# Patient Record
Sex: FEMALE | Race: WHITE | HISPANIC OR LATINO | Employment: UNEMPLOYED | URBAN - METROPOLITAN AREA
[De-identification: names, ages, dates, MRNs, and addresses within clinical notes are randomized per-mention and may not be internally consistent; named-entity substitution may affect disease eponyms.]

---

## 2020-03-27 ENCOUNTER — HOSPITAL ENCOUNTER (EMERGENCY)
Facility: MEDICAL CENTER | Age: 60
End: 2020-03-27
Attending: EMERGENCY MEDICINE
Payer: COMMERCIAL

## 2020-03-27 ENCOUNTER — APPOINTMENT (OUTPATIENT)
Dept: RADIOLOGY | Facility: MEDICAL CENTER | Age: 60
End: 2020-03-27
Attending: EMERGENCY MEDICINE
Payer: COMMERCIAL

## 2020-03-27 VITALS
DIASTOLIC BLOOD PRESSURE: 60 MMHG | RESPIRATION RATE: 18 BRPM | HEIGHT: 67 IN | HEART RATE: 75 BPM | WEIGHT: 170.42 LBS | OXYGEN SATURATION: 98 % | BODY MASS INDEX: 26.75 KG/M2 | SYSTOLIC BLOOD PRESSURE: 101 MMHG | TEMPERATURE: 98.6 F

## 2020-03-27 DIAGNOSIS — S09.90XA CLOSED HEAD INJURY, INITIAL ENCOUNTER: ICD-10-CM
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
## 2020-03-27 PROCEDURE — 99283 EMERGENCY DEPT VISIT LOW MDM: CPT

## 2020-03-27 PROCEDURE — 70450 CT HEAD/BRAIN W/O DYE: CPT

## 2020-03-27 ASSESSMENT — ENCOUNTER SYMPTOMS
DOUBLE VISION: 0
SHORTNESS OF BREATH: 0
ABDOMINAL PAIN: 0
HEADACHES: 1
BLURRED VISION: 0

## 2020-03-28 NOTE — ED PROVIDER NOTES
ED Provider Note    Means of arrival: private vehicle  History obtained from: patient  History limited by: none    CHIEF COMPLAINT  Chief Complaint   Patient presents with   • Fall       HPI  Susy Wheeler is a 59 y.o. female who presents to the Emergency Department for fall.  Patient reports that she was walking her dogs and tripped and fell hitting the left side of her head on pavement.  This occurred approximately 1 hour prior to arrival.  She does not believe she lost consciousness.  She reports that she has mild throbbing left-sided headache since the fall.  Denies any neck pain or any other injuries.  Patient is concerned about intracranial hemorrhage and therefore came in for further evaluation.  She does not take any blood thinners.  She denies blurred vision, numbness, tingling, or weakness.    REVIEW OF SYSTEMS  Review of Systems   Eyes: Negative for blurred vision and double vision.   Respiratory: Negative for shortness of breath.    Cardiovascular: Negative for chest pain.   Gastrointestinal: Negative for abdominal pain.   Neurological: Positive for headaches.   All other systems reviewed and are negative.        PAST MEDICAL HISTORY   None    SURGICAL HISTORY   has a past surgical history that includes gyn surgery.    SOCIAL HISTORY  Social History     Tobacco Use   • Smoking status: Never Smoker   • Smokeless tobacco: Never Used   Substance Use Topics   • Alcohol use: Yes   • Drug use: Not Currently      Social History     Substance and Sexual Activity   Drug Use Not Currently       FAMILY HISTORY  History reviewed. No pertinent family history.    CURRENT MEDICATIONS  Home Medications     Reviewed by Jorge Berger R.N. (Registered Nurse) on 03/27/20 at 6378  Med List Status: Complete   Medication Last Dose Status        Patient Delano Taking any Medications                       ALLERGIES  No Known Allergies    PHYSICAL EXAM  VITAL SIGNS: /90   Pulse 94   Temp 36.6 °C (97.9 °F) (Tympanic)    "Resp 18   Ht 1.702 m (5' 7\")   Wt 77.3 kg (170 lb 6.7 oz)   SpO2 95%   BMI 26.69 kg/m²   Vitals reviewed by myself.  Physical Exam   Constitutional: She is oriented to person, place, and time and well-developed, well-nourished, and in no distress. No distress.   HENT:   Head: Normocephalic and atraumatic.   Eyes: Pupils are equal, round, and reactive to light. EOM are normal.   Neck: Normal range of motion.   Cardiovascular: Normal rate, regular rhythm and normal heart sounds. Exam reveals no gallop and no friction rub.   No murmur heard.  Pulmonary/Chest: Effort normal and breath sounds normal.   Musculoskeletal: Normal range of motion.      Comments: Strength is 5/5 in all extremities, no midline spinal tenderness, patient ambulates with a narrow based steady gait.    Neurological: She is alert and oriented to person, place, and time. No cranial nerve deficit. GCS score is 15.   Sensation intact in all extremities, no dysmetria on cerebellar testing    Skin: Skin is warm and dry.         DIAGNOSTIC STUDIES /  LABS      RADIOLOGY  CT-HEAD W/O   Final Result      No acute intracranial abnormality.        The radiologist's interpretation of all radiological studies have been reviewed by me.        COURSE & MEDICAL DECISION MAKING  Nursing notes, VS, PMSFHx reviewed in chart.    Patient is a 59 year old female who comes in for evaluation of headache after mechanical ground-level fall.  Differential diagnosis includes closed head injury, intracranial hemorrhage, concussion.  Diagnostic work-up includes CT of the head.    Patient's initial vitals are within normal limits, patient is neurologically intact on exam.  CT returns demonstrates no acute intracranial abnormalities.  Therefore patient is reassured and advised on symptomatic management of closed head injury.  She is then given strict return precautions and discharged in stable condition.      FINAL IMPRESSION  1. Fall, initial encounter    2. Closed head " injury, initial encounter

## 2021-01-18 ENCOUNTER — HOSPITAL ENCOUNTER (OUTPATIENT)
Facility: MEDICAL CENTER | Age: 61
End: 2021-01-18
Attending: PHYSICIAN ASSISTANT
Payer: COMMERCIAL

## 2021-01-18 ENCOUNTER — NURSE TRIAGE (OUTPATIENT)
Dept: HEALTH INFORMATION MANAGEMENT | Facility: OTHER | Age: 61
End: 2021-01-18

## 2021-01-18 ENCOUNTER — OFFICE VISIT (OUTPATIENT)
Dept: URGENT CARE | Facility: CLINIC | Age: 61
End: 2021-01-18
Payer: COMMERCIAL

## 2021-01-18 VITALS
TEMPERATURE: 98.3 F | DIASTOLIC BLOOD PRESSURE: 80 MMHG | OXYGEN SATURATION: 97 % | RESPIRATION RATE: 20 BRPM | SYSTOLIC BLOOD PRESSURE: 134 MMHG | BODY MASS INDEX: 24.71 KG/M2 | HEIGHT: 68 IN | WEIGHT: 163 LBS | HEART RATE: 78 BPM

## 2021-01-18 DIAGNOSIS — R19.5 LOOSE STOOLS: ICD-10-CM

## 2021-01-18 DIAGNOSIS — R11.0 NAUSEA: ICD-10-CM

## 2021-01-18 LAB
APPEARANCE UR: CLEAR
BILIRUB UR STRIP-MCNC: NORMAL MG/DL
COLOR UR AUTO: YELLOW
GLUCOSE UR STRIP.AUTO-MCNC: NORMAL MG/DL
KETONES UR STRIP.AUTO-MCNC: NORMAL MG/DL
LEUKOCYTE ESTERASE UR QL STRIP.AUTO: NORMAL
NITRITE UR QL STRIP.AUTO: NORMAL
PH UR STRIP.AUTO: 5.5 [PH] (ref 5–8)
PROT UR QL STRIP: NORMAL MG/DL
RBC UR QL AUTO: NORMAL
SP GR UR STRIP.AUTO: 1.03
UROBILINOGEN UR STRIP-MCNC: 0.2 MG/DL

## 2021-01-18 PROCEDURE — 81002 URINALYSIS NONAUTO W/O SCOPE: CPT | Performed by: PHYSICIAN ASSISTANT

## 2021-01-18 PROCEDURE — 99203 OFFICE O/P NEW LOW 30 MIN: CPT | Performed by: PHYSICIAN ASSISTANT

## 2021-01-18 PROCEDURE — U0005 INFEC AGEN DETEC AMPLI PROBE: HCPCS

## 2021-01-18 PROCEDURE — U0003 INFECTIOUS AGENT DETECTION BY NUCLEIC ACID (DNA OR RNA); SEVERE ACUTE RESPIRATORY SYNDROME CORONAVIRUS 2 (SARS-COV-2) (CORONAVIRUS DISEASE [COVID-19]), AMPLIFIED PROBE TECHNIQUE, MAKING USE OF HIGH THROUGHPUT TECHNOLOGIES AS DESCRIBED BY CMS-2020-01-R: HCPCS

## 2021-01-18 RX ORDER — ALUMINA, MAGNESIA, AND SIMETHICONE 2400; 2400; 240 MG/30ML; MG/30ML; MG/30ML
10 SUSPENSION ORAL 4 TIMES DAILY PRN
COMMUNITY
End: 2021-04-05

## 2021-01-18 RX ORDER — ONDANSETRON 4 MG/1
4 TABLET, FILM COATED ORAL EVERY 4 HOURS PRN
Qty: 10 TAB | Refills: 0 | Status: SHIPPED | OUTPATIENT
Start: 2021-01-18 | End: 2021-04-05

## 2021-01-18 ASSESSMENT — ENCOUNTER SYMPTOMS
CHILLS: 0
BLOOD IN STOOL: 0
DIARRHEA: 1
CONSTIPATION: 0
VOMITING: 0
FEVER: 0
HEARTBURN: 0
ABDOMINAL PAIN: 0
NAUSEA: 1

## 2021-01-18 NOTE — TELEPHONE ENCOUNTER
"For 1 week patient has been getting indigestion.  She has had diarrhea, nausea and vomiting, lack of appetite.  Pain in abdomen. Pain is not constant but is higher up in the middle of her upper abdomen.    Pt is scheduled for UC visit today due to abdominal pain and Nausea x 7 days.    Reason for Disposition  • Patient sounds very sick or weak to the triager    Additional Information  • Negative: Shock suspected (e.g., cold/pale/clammy skin, too weak to stand, low BP, rapid pulse)  • Negative: Sounds like a life-threatening emergency to the triager  • Negative: Nausea or vomiting and pregnancy < 20 weeks  • Negative: Menstrual Period - Missed or Late (i.e., pregnancy suspected)  • Negative: Heat exhaustion suspected (i.e., dehydration from heat exposure)  • Negative: Anxiety or stress suspected (i.e., nausea with anxiety attacks or stressful situations)  • Negative: Traumatic Brain Injury (TBI) suspected  • Negative: Vomiting occurs  • Negative: Other symptom is present, see that guideline.  (e.g., chest pain, headache, dizziness, abdominal pain, colds, sore throat, etc.).  • Negative: Unable to walk, or can only walk with assistance (e.g., requires support)  • Negative: Difficulty breathing  • Negative: Insulin-dependent diabetes (Type I) and glucose > 400 mg/dL (22 mmol/L)  • Negative: Drinking very little and dehydration suspected (e.g., no urine > 12 hours, very dry mouth, very lightheaded)    Answer Assessment - Initial Assessment Questions  1. NAUSEA SEVERITY: \"How bad is the nausea?\" (e.g., mild, moderate, severe; dehydration, weight loss)    - MILD: loss of appetite without change in eating habits    - MODERATE: decreased oral intake without significant weight loss, dehydration, or malnutrition    - SEVERE: inadequate caloric or fluid intake, significant weight loss, symptoms of dehydration      Nausea and vomiting (only 2 times in 7 days)  2. ONSET: \"When did the nausea begin?\"      About 7  Days ago  3. " "VOMITING: \"Any vomiting?\" If so, ask: \"How many times today?\"      2 times only in the last 7 days  4. RECURRENT SYMPTOM: \"Have you had nausea before?\" If so, ask: \"When was the last time?\" \"What happened that time?\"      GERD but feels this is worse and not going garima  5. CAUSE: \"What do you think is causing the nausea?\"      GERD  6. PREGNANCY: \"Is there any chance you are pregnant?\" (e.g., unprotected intercourse, missed birth control pill, broken condom)      NO    Protocols used: NAUSEA-A-OH      "

## 2021-01-18 NOTE — PROGRESS NOTES
Subjective:   Susy Wheeler is a 60 y.o. female who presents for Nausea (x 1 week, indigestion, diarrhea, slightly upper abdominal pain)        This is a new problem.  Patient presents with persistent nausea and loose stools for the last week.  She did have a brief improvement in symptoms yesterday but she felt nauseous again this morning.  She is not having any abdominal pain, chest pain, back pain, shoulder pain shortness of breath, difficulty breathing, colicky pain, dysuria, urinary frequency, urinary urgency, flank pain fevers, chills.  Stools have been persistently loose, but frequency of bowel movements is decreasing.  Overall she does feel like symptoms are improving.  She did travel to [place] approximately 10 days ago.  No known Covid exposures but she is concerned about possible Covid.  Denies cough or cold type symptoms.  She took Maalox without significant symptomatic improvement.    Review of Systems   Constitutional: Positive for malaise/fatigue. Negative for chills and fever.   Gastrointestinal: Positive for diarrhea and nausea. Negative for abdominal pain, blood in stool, constipation, heartburn, melena and vomiting.       PMH:  has a past medical history of Urinary tract infection.  MEDS:   Current Outpatient Medications:   •  ondansetron (ZOFRAN) 4 MG Tab tablet, Take 1 Tab by mouth every four hours as needed for Nausea/Vomiting., Disp: 10 Tab, Rfl: 0  •  mag hydrox-al hydrox-simeth (MAALOX PLUS ES OR MYLANTA DS) 400-400-40 MG/5ML Suspension, Take 10 mL by mouth 4 times a day as needed., Disp: , Rfl:   ALLERGIES: No Known Allergies  SURGHX:   Past Surgical History:   Procedure Laterality Date   • GYN SURGERY       SOCHX:  reports that she has never smoked. She has never used smokeless tobacco. She reports previous alcohol use. She reports previous drug use.  FH: Family history was reviewed, no pertinent findings to report   Objective:   /80 (BP Location: Right arm, Patient Position:  "Sitting, BP Cuff Size: Adult)   Pulse 78   Temp 36.8 °C (98.3 °F) (Temporal)   Resp 20   Ht 1.727 m (5' 8\")   Wt 73.9 kg (163 lb)   SpO2 97%   BMI 24.78 kg/m²   Physical Exam  Vitals signs reviewed.   Constitutional:       General: She is not in acute distress.     Appearance: Normal appearance. She is well-developed. She is not toxic-appearing.   HENT:      Head: Normocephalic and atraumatic.      Right Ear: External ear normal.      Left Ear: External ear normal.      Nose: Nose normal. No congestion.   Eyes:      General: Gaze aligned appropriately.   Neck:      Musculoskeletal: Neck supple.   Cardiovascular:      Rate and Rhythm: Normal rate and regular rhythm.      Heart sounds: Normal heart sounds, S1 normal and S2 normal.      Comments: Early systolic murmur- pt states that this is baseline.  Pulmonary:      Effort: Pulmonary effort is normal. No respiratory distress.      Breath sounds: Normal breath sounds. No stridor. No decreased breath sounds, wheezing, rhonchi or rales.   Abdominal:      Comments: Abdomen is atraumatic, soft, flat.  Bowel sounds are normoactive.  No tenderness with deep palpation.  Negative Salazar's, negative McBurney's point tenderness.  No CVA tenderness bilaterally.   Skin:     General: Skin is warm and dry.      Capillary Refill: Capillary refill takes less than 2 seconds.   Neurological:      Mental Status: She is alert and oriented to person, place, and time.      Comments: CN2-12 grossly intact   Psychiatric:         Speech: Speech normal.         Behavior: Behavior normal.           Assessment/Plan:   1. Nausea  - POCT Urinalysis  - ondansetron (ZOFRAN) 4 MG Tab tablet; Take 1 Tab by mouth every four hours as needed for Nausea/Vomiting.  Dispense: 10 Tab; Refill: 0  - COVID/SARS CoV-2 PCR; Future    2. Loose stools  - COVID/SARS CoV-2 PCR; Future    Other orders  - mag hydrox-al hydrox-simeth (MAALOX PLUS ES OR MYLANTA DS) 400-400-40 MG/5ML Suspension; Take 10 mL by mouth " 4 times a day as needed.    Patient is well-appearing and vital signs stable.  Overall symptoms are improving. No abdominal tenderness. Viral etiology most likely.  This does not look like acute, emergent intra-abdominal pathology or atypical ACS.  Considerations discussed with patient.  Patient also advised that symptoms that being said if she develops new or worsening symptoms I would like her to be reevaluated.  She develop severe pain, chest shortness of breath, elevated fevers or other concerning symptoms-I would like her to go to the ER for further evaluation.    Patient will quarantine.  Covid results pending.  I will contact her with these and adjust treatment plan as indicated.  Patient prescribed small amount of Zofran to take as needed for nausea.  I also recommend trialing Imodium for loose stools.  BRAT diet.  Plenty of fluids, 50-50 water Gatorade mix.    Differential diagnosis, natural history, supportive care, and indications for immediate follow-up discussed.

## 2021-01-19 DIAGNOSIS — R19.5 LOOSE STOOLS: ICD-10-CM

## 2021-01-19 DIAGNOSIS — R11.0 NAUSEA: ICD-10-CM

## 2021-01-19 LAB
COVID ORDER STATUS COVID19: NORMAL
SARS-COV-2 RNA RESP QL NAA+PROBE: NOTDETECTED
SPECIMEN SOURCE: NORMAL

## 2021-01-21 ENCOUNTER — TELEPHONE (OUTPATIENT)
Dept: MEDICAL GROUP | Facility: PHYSICIAN GROUP | Age: 61
End: 2021-01-21

## 2021-01-21 NOTE — TELEPHONE ENCOUNTER
Future Appointments       Provider Department Center    1/27/2021 11:00 AM MAYELA Montelongo [facility] [place]        NEW PATIENT VISIT PRE-VISIT PLANNING    1.  E[employer] Patient is checked in Patient Demographics?Yes    2.  Immunizations were updated in Epic using Reconcile Outside Information activity? Yes         3.  Is this appointment scheduled as a Hospital Follow-Up? No    4.  Patient is due for the following Health Maintenance Topics:   Health Maintenance Due   Topic Date Due   • HEPATITIS C SCREENING  1960   • PAP SMEAR  08/14/1981   • MAMMOGRAM  08/14/2000   • COLONOSCOPY  08/14/2010   • IMM ZOSTER VACCINES (1 of 2) 08/14/2010   • IMM INFLUENZA (1) 09/01/2020         5.  Reviewed/Updated the following with patient:       •   Preferred Pharmacy? No       •   Preferred Lab? No       •   Preferred Communication? No       •   Allergies? No       •   Medications? NO       •   Social History? No       •   Family History (document living status of immediate family members and if + hx of  cancer, diabetes, hypertension, hyperlipidemia, heart attack, stroke) No    6.  Updated Care Team?       •   DME Company (gait device, O2, CPAP, etc.) NO       •   Other Specialists (eye doctor, derm, GYN, cardiology, endo, etc): NO    7.  AHA (Puls8) form printed for Provider? N/A     Left 3 VM for Pt to call back to complete PVP   Unable to reach Pt at this time

## 2021-03-15 DIAGNOSIS — Z23 NEED FOR VACCINATION: ICD-10-CM

## 2021-03-29 ENCOUNTER — TELEPHONE (OUTPATIENT)
Dept: MEDICAL GROUP | Facility: PHYSICIAN GROUP | Age: 61
End: 2021-03-29

## 2021-03-29 NOTE — TELEPHONE ENCOUNTER
Future Appointments       Provider Department Center    4/5/2021 8:30 AM MAYELA Montelongo [facility] [place]        NEW PATIENT VISIT PRE-VISIT PLANNING    1.  Epic[employer] Patient is checked in Patient Demographics?Yes    2.  Immunizations were updated in Epic using Reconcile Outside Information activity? No, nothing in [employer]          3.  Is this appointment scheduled as a Hospital Follow-Up? No    4.  Patient is due for the following Health Maintenance Topics:   Health Maintenance Due   Topic Date Due   • HEPATITIS C SCREENING  Never done   • COVID-19 Vaccine (1) Never done   • PAP SMEAR  Never done   • MAMMOGRAM  Never done   • COLONOSCOPY  Never done   • IMM ZOSTER VACCINES (1 of 2) Never done   • IMM INFLUENZA (1) 09/01/2020     5.  Reviewed/Updated the following with patient:       •   Preferred Pharmacy? Yes       •   Preferred Lab? Yes       •   Preferred Communication? Yes       •   Allergies? Yes       •   Medications? YES. Was Abstract Encounter opened and chart updated? YES       •   Social History? Yes       •   Family History (document living status of immediate family members and if + hx of  cancer, diabetes, hypertension, hyperlipidemia, heart attack, stroke) Yes    6.  Updated Care Team?       •   DME Company (gait device, O2, CPAP, etc.) NO       •   Other Specialists (eye doctor, derm, GYN, cardiology, endo, etc): N\A    7.  AHA (Puls8) form printed for Provider? N/A   Patient advised to arrive 15 minutes prior to scheduled appointment

## 2021-04-04 SDOH — ECONOMIC STABILITY: TRANSPORTATION INSECURITY
IN THE PAST 12 MONTHS, HAS LACK OF RELIABLE TRANSPORTATION KEPT YOU FROM MEDICAL APPOINTMENTS, MEETINGS, WORK OR FROM GETTING THINGS NEEDED FOR DAILY LIVING?: NO

## 2021-04-04 SDOH — ECONOMIC STABILITY: HOUSING INSECURITY
IN THE LAST 12 MONTHS, WAS THERE A TIME WHEN YOU DID NOT HAVE A STEADY PLACE TO SLEEP OR SLEPT IN A SHELTER (INCLUDING NOW)?: NO

## 2021-04-04 SDOH — ECONOMIC STABILITY: INCOME INSECURITY: IN THE LAST 12 MONTHS, WAS THERE A TIME WHEN YOU WERE NOT ABLE TO PAY THE MORTGAGE OR RENT ON TIME?: NO

## 2021-04-04 SDOH — HEALTH STABILITY: PHYSICAL HEALTH: ON AVERAGE, HOW MANY MINUTES DO YOU ENGAGE IN EXERCISE AT THIS LEVEL?: 10 MINUTES

## 2021-04-04 SDOH — ECONOMIC STABILITY: TRANSPORTATION INSECURITY
IN THE PAST 12 MONTHS, HAS THE LACK OF TRANSPORTATION KEPT YOU FROM MEDICAL APPOINTMENTS OR FROM GETTING MEDICATIONS?: NO

## 2021-04-04 SDOH — ECONOMIC STABILITY: HOUSING INSECURITY: IN THE LAST 12 MONTHS, HOW MANY PLACES HAVE YOU LIVED?: 1

## 2021-04-04 SDOH — HEALTH STABILITY: PHYSICAL HEALTH: ON AVERAGE, HOW MANY DAYS PER WEEK DO YOU ENGAGE IN MODERATE TO STRENUOUS EXERCISE (LIKE A BRISK WALK)?: 1 DAY

## 2021-04-04 SDOH — HEALTH STABILITY: MENTAL HEALTH
STRESS IS WHEN SOMEONE FEELS TENSE, NERVOUS, ANXIOUS, OR CAN'T SLEEP AT NIGHT BECAUSE THEIR MIND IS TROUBLED. HOW STRESSED ARE YOU?: ONLY A LITTLE

## 2021-04-04 ASSESSMENT — SOCIAL DETERMINANTS OF HEALTH (SDOH)
HOW OFTEN DO YOU HAVE A DRINK CONTAINING ALCOHOL: MONTHLY OR LESS
HOW OFTEN DO YOU ATTEND CHURCH OR RELIGIOUS SERVICES?: NEVER
WITHIN THE PAST 12 MONTHS, THE FOOD YOU BOUGHT JUST DIDN'T LAST AND YOU DIDN'T HAVE MONEY TO GET MORE: NEVER TRUE
HOW HARD IS IT FOR YOU TO PAY FOR THE VERY BASICS LIKE FOOD, HOUSING, MEDICAL CARE, AND HEATING?: NOT HARD AT ALL
IN A TYPICAL WEEK, HOW MANY TIMES DO YOU TALK ON THE PHONE WITH FAMILY, FRIENDS, OR NEIGHBORS?: MORE THAN THREE TIMES A WEEK
WITHIN THE PAST 12 MONTHS, YOU WORRIED THAT YOUR FOOD WOULD RUN OUT BEFORE YOU GOT THE MONEY TO BUY MORE: NEVER TRUE
HOW OFTEN DO YOU ATTENT MEETINGS OF THE CLUB OR ORGANIZATION YOU BELONG TO?: NEVER
HOW MANY DRINKS CONTAINING ALCOHOL DO YOU HAVE ON A TYPICAL DAY WHEN YOU ARE DRINKING: 1 OR 2
DO YOU BELONG TO ANY CLUBS OR ORGANIZATIONS SUCH AS CHURCH GROUPS UNIONS, FRATERNAL OR ATHLETIC GROUPS, OR SCHOOL GROUPS?: NO
HOW OFTEN DO YOU GET TOGETHER WITH FRIENDS OR RELATIVES?: ONCE A WEEK
HOW OFTEN DO YOU HAVE SIX OR MORE DRINKS ON ONE OCCASION: NEVER

## 2021-04-05 ENCOUNTER — OFFICE VISIT (OUTPATIENT)
Dept: MEDICAL GROUP | Facility: PHYSICIAN GROUP | Age: 61
End: 2021-04-05
Payer: COMMERCIAL

## 2021-04-05 VITALS
DIASTOLIC BLOOD PRESSURE: 70 MMHG | SYSTOLIC BLOOD PRESSURE: 118 MMHG | WEIGHT: 166 LBS | HEIGHT: 67 IN | HEART RATE: 65 BPM | TEMPERATURE: 97.1 F | BODY MASS INDEX: 26.06 KG/M2 | OXYGEN SATURATION: 95 %

## 2021-04-05 DIAGNOSIS — Z76.89 ENCOUNTER TO ESTABLISH CARE: ICD-10-CM

## 2021-04-05 DIAGNOSIS — Z12.12 SCREENING FOR COLORECTAL CANCER: ICD-10-CM

## 2021-04-05 DIAGNOSIS — E78.2 MIXED HYPERLIPIDEMIA: ICD-10-CM

## 2021-04-05 DIAGNOSIS — E04.9 ENLARGED THYROID GLAND: ICD-10-CM

## 2021-04-05 DIAGNOSIS — Z13.21 ENCOUNTER FOR VITAMIN DEFICIENCY SCREENING: ICD-10-CM

## 2021-04-05 DIAGNOSIS — Z12.11 SCREENING FOR COLORECTAL CANCER: ICD-10-CM

## 2021-04-05 DIAGNOSIS — R73.03 PREDIABETES: ICD-10-CM

## 2021-04-05 DIAGNOSIS — Z12.31 ENCOUNTER FOR SCREENING MAMMOGRAM FOR BREAST CANCER: ICD-10-CM

## 2021-04-05 PROBLEM — E78.5 HYPERLIPIDEMIA: Status: ACTIVE | Noted: 2019-07-16

## 2021-04-05 PROCEDURE — 99214 OFFICE O/P EST MOD 30 MIN: CPT | Performed by: NURSE PRACTITIONER

## 2021-04-05 ASSESSMENT — PATIENT HEALTH QUESTIONNAIRE - PHQ9: CLINICAL INTERPRETATION OF PHQ2 SCORE: 0

## 2021-04-05 NOTE — PROGRESS NOTES
Subjective:     CC:  Diagnoses of Encounter to establish care, Prediabetes, Mixed hyperlipidemia, Enlarged thyroid gland, Encounter for screening mammogram for breast cancer, Screening for colorectal cancer, and Encounter for vitamin deficiency screening were pertinent to this visit.    HISTORY OF THE PRESENT ILLNESS: Patient is a 60 y.o. female. This pleasant patient is here today to establish care and discuss the following. Her prior PCP was with [facility].    Enlarged thyroid gland  Acute medical problem.  She has been noticing increased weight gain.  She denies any family history of thyroid disease.    Mixed hyperlipidemia  Chronic medical problem.  She does not take any cholesterol-lowering medication.  She has not been watching her diet or exercising.  She would like labs ordered. Last labs from care everywhere are from 7/15/2019.  Total cholesterol was 232, triglycerides 165, HDL 61, and .     Prediabetes  Chronic medical problem.  She is not taking any medication.  She would like updated labs.  Last results show Hemoglobin A1c 5.9% on 7/15/2019.       Allergies: Patient has no known allergies.    No current [facility]-ordered outpatient medications on file.     No current [facility]-ordered facility-administered medications on file.       Past Medical History:   Diagnosis Date   • Enlarged thyroid gland 2021   • Hyperlipidemia    • Prediabetes    • Urinary tract infection        Past Surgical History:   Procedure Laterality Date   • GYN SURGERY      total hysterectomy, cervix removed       Social History     Tobacco Use   • Smoking status: Former Smoker     Packs/day: 0.25     Years: 4.00     Pack years: 1.00     Quit date: 1986     Years since quittin.0   • Smokeless tobacco: Never Used   • Tobacco comment: smoked 4 years in college, 3 per day social   Substance Use Topics   • Alcohol use: Not Currently     Comment: 2 glasses red wine per month   • Drug use: Not Currently       Social  "History     Social History Narrative   • Not on file       Family History   Problem Relation Age of Onset   • COPD Mother    • Hypertension Father    • Stroke Father    • Hypertension Sister    • Hypertension Brother    • Arthritis Brother    • COPD Brother    • Cancer Paternal Aunt         cervix cancer   • Cancer Maternal Grandmother         chin and mouth   • Heart Disease Maternal Grandfather    • Hypertension Brother    • Anxiety disorder Brother    • Hypertension Sister    • Cancer Sister         Pancreatic cancer   • Cancer Paternal Aunt         breast       Health Maintenance: Due for COVID-19 vaccine, she states she will get her second dose tomorrow, colonoscopy, zoster vaccine, mammogram.    ROS:   Gen: no fevers/chills, + changes in weight  Eyes: no changes in vision  ENT: no sore throat  Pulm: no shortness of breath, no cough  CV: no chest pain, no palpitations  GI: no nausea/vomiting  MSk: no myalgias  Skin: no rash  Neuro: no headaches, no dizziness      Objective:     Vital signs reviewed  Exam: /70 (BP Location: Left arm, Patient Position: Sitting, BP Cuff Size: Adult)   Pulse 65   Temp 36.2 °C (97.1 °F) (Temporal)   Ht 1.695 m (5' 6.73\")   Wt 75.3 kg (166 lb)   SpO2 95%  Body mass index is 26.21 kg/m².    General: Normal appearing. No distress.  HENT: Normocephalic. Ears normal shape and contour.  Eyes: Eyes conjunctiva clear lids without ptosis, lids normal.  Neck: Supple without JVD. Thyroid is enlarged.  Pulmonary: Clear to ausculation.  Normal effort. No rales, ronchi, or wheezing.  Cardiovascular: Regular rate and rhythm without murmur. Radial pulses are intact and equal bilaterally.  Abdomen: Soft, nontender, nondistended.   Neurologic: Grossly nonfocal  Lymph: No cervical or supraclavicular lymph nodes are palpable  Skin: Warm and dry.  No obvious lesions.  Musculoskeletal: Normal gait. No extremity cyanosis, clubbing, or edema.  Psych: Normal mood and affect. Alert and oriented " x3. Judgment and insight is normal.      Assessment & Plan:   60 y.o. female with the following -    1. Encounter to establish care  Acute uncomplicated problem.  Care established today.  Care everywhere results reviewed.    2. Prediabetes  Chronic stable problem.  Due for updated labs.  Discussed diet and lifestyle modifications.  Follow-up labs via [facility].  - HEMOGLOBIN A1C; Future  - TSH WITH REFLEX TO FT4; Future    3. Mixed hyperlipidemia  Chronic stable problem.  Due for updated labs.  Discussed diet last modifications.  Follow-up labs via [facility].  - CBC WITH DIFFERENTIAL; Future  - Comp Metabolic Panel; Future  - Lipid Profile; Future  - TSH WITH REFLEX TO FT4; Future    4. Enlarged thyroid gland  Acute uncomplicated problem.  No nodules appreciated on exam today.  Will check thyroid level and ultrasound.  Follow-up results via [facility].  - US-THYROID; Future    5. Encounter for screening mammogram for breast cancer  Acute uncomplicated problem.  Screening indicated.  Patient is in agreement.  Orders placed.  - MA-SCREENING MAMMO BILAT W/CAD; Future    6. Screening for colorectal cancer  Acute uncomplicated problem.  Screening indicated.  Patient is in agreement.  Orders placed.  - REFERRAL TO GI FOR COLONOSCOPY    7. Encounter for vitamin deficiency screening  Acute uncomplicated problem.  Screening indicated.  Patient is in agreement.  Orders placed.  - VITAMIN B12; Future  - VITAMIN D,25 HYDROXY; Future      Return in about 6 months (around 10/5/2021) for cholesterol .    Please note that this dictation was created using voice recognition software. I have made every reasonable attempt to correct obvious errors, but I expect that there are errors of grammar and possibly content that I did not discover before finalizing the note.

## 2021-04-05 NOTE — ASSESSMENT & PLAN NOTE
Chronic medical problem.  She does not take any cholesterol-lowering medication.  She has not been watching her diet or exercising.  She would like labs ordered. Last labs from [facility] everywhere are from 7/15/2019.  Total cholesterol was 232, triglycerides 165, HDL 61, and .

## 2021-04-05 NOTE — ASSESSMENT & PLAN NOTE
Acute medical problem.  She has been noticing increased weight gain.  She denies any family history of thyroid disease.

## 2021-04-05 NOTE — ASSESSMENT & PLAN NOTE
Chronic medical problem.  She is not taking any medication.  She would like updated labs.  Last results show Hemoglobin A1c 5.9% on 7/15/2019.

## 2021-04-06 ENCOUNTER — IMMUNIZATION (OUTPATIENT)
Dept: FAMILY PLANNING/WOMEN'S HEALTH CLINIC | Facility: IMMUNIZATION CENTER | Age: 61
End: 2021-04-06
Attending: INTERNAL MEDICINE
Payer: COMMERCIAL

## 2021-04-06 ENCOUNTER — HOSPITAL ENCOUNTER (OUTPATIENT)
Dept: LAB | Facility: MEDICAL CENTER | Age: 61
End: 2021-04-06
Attending: NURSE PRACTITIONER
Payer: COMMERCIAL

## 2021-04-06 DIAGNOSIS — Z23 ENCOUNTER FOR VACCINATION: Primary | ICD-10-CM

## 2021-04-06 DIAGNOSIS — Z13.21 ENCOUNTER FOR VITAMIN DEFICIENCY SCREENING: ICD-10-CM

## 2021-04-06 DIAGNOSIS — R73.03 PREDIABETES: ICD-10-CM

## 2021-04-06 DIAGNOSIS — Z23 NEED FOR VACCINATION: ICD-10-CM

## 2021-04-06 DIAGNOSIS — E78.2 MIXED HYPERLIPIDEMIA: ICD-10-CM

## 2021-04-06 LAB
ALBUMIN SERPL BCP-MCNC: 4.1 G/DL (ref 3.2–4.9)
ALBUMIN/GLOB SERPL: 1.3 G/DL
ALP SERPL-CCNC: 94 U/L (ref 30–99)
ALT SERPL-CCNC: 25 U/L (ref 2–50)
ANION GAP SERPL CALC-SCNC: 7 MMOL/L (ref 7–16)
AST SERPL-CCNC: 25 U/L (ref 12–45)
BASOPHILS # BLD AUTO: 1.4 % (ref 0–1.8)
BASOPHILS # BLD: 0.11 K/UL (ref 0–0.12)
BILIRUB SERPL-MCNC: 0.6 MG/DL (ref 0.1–1.5)
BUN SERPL-MCNC: 15 MG/DL (ref 8–22)
CALCIUM SERPL-MCNC: 9.4 MG/DL (ref 8.5–10.5)
CHLORIDE SERPL-SCNC: 103 MMOL/L (ref 96–112)
CHOLEST SERPL-MCNC: 240 MG/DL (ref 100–199)
CO2 SERPL-SCNC: 27 MMOL/L (ref 20–33)
CREAT SERPL-MCNC: 0.77 MG/DL (ref 0.5–1.4)
EOSINOPHIL # BLD AUTO: 0.18 K/UL (ref 0–0.51)
EOSINOPHIL NFR BLD: 2.3 % (ref 0–6.9)
ERYTHROCYTE [DISTWIDTH] IN BLOOD BY AUTOMATED COUNT: 47.5 FL (ref 35.9–50)
FASTING STATUS PATIENT QL REPORTED: NORMAL
GLOBULIN SER CALC-MCNC: 3.2 G/DL (ref 1.9–3.5)
GLUCOSE SERPL-MCNC: 94 MG/DL (ref 65–99)
HCT VFR BLD AUTO: 46 % (ref 37–47)
HDLC SERPL-MCNC: 63 MG/DL
HGB BLD-MCNC: 14.5 G/DL (ref 12–16)
IMM GRANULOCYTES # BLD AUTO: 0.03 K/UL (ref 0–0.11)
IMM GRANULOCYTES NFR BLD AUTO: 0.4 % (ref 0–0.9)
LDLC SERPL CALC-MCNC: 148 MG/DL
LYMPHOCYTES # BLD AUTO: 2.73 K/UL (ref 1–4.8)
LYMPHOCYTES NFR BLD: 34.7 % (ref 22–41)
MCH RBC QN AUTO: 30 PG (ref 27–33)
MCHC RBC AUTO-ENTMCNC: 31.5 G/DL (ref 33.6–35)
MCV RBC AUTO: 95 FL (ref 81.4–97.8)
MONOCYTES # BLD AUTO: 0.56 K/UL (ref 0–0.85)
MONOCYTES NFR BLD AUTO: 7.1 % (ref 0–13.4)
NEUTROPHILS # BLD AUTO: 4.25 K/UL (ref 2–7.15)
NEUTROPHILS NFR BLD: 54.1 % (ref 44–72)
NRBC # BLD AUTO: 0 K/UL
NRBC BLD-RTO: 0 /100 WBC
PLATELET # BLD AUTO: 160 K/UL (ref 164–446)
PMV BLD AUTO: 13 FL (ref 9–12.9)
POTASSIUM SERPL-SCNC: 4.5 MMOL/L (ref 3.6–5.5)
PROT SERPL-MCNC: 7.3 G/DL (ref 6–8.2)
RBC # BLD AUTO: 4.84 M/UL (ref 4.2–5.4)
SODIUM SERPL-SCNC: 137 MMOL/L (ref 135–145)
TRIGL SERPL-MCNC: 147 MG/DL (ref 0–149)
WBC # BLD AUTO: 7.9 K/UL (ref 4.8–10.8)

## 2021-04-06 PROCEDURE — 36415 COLL VENOUS BLD VENIPUNCTURE: CPT

## 2021-04-06 PROCEDURE — 82306 VITAMIN D 25 HYDROXY: CPT

## 2021-04-06 PROCEDURE — 80053 COMPREHEN METABOLIC PANEL: CPT

## 2021-04-06 PROCEDURE — 91300 PFIZER SARS-COV-2 VACCINE: CPT | Performed by: INTERNAL MEDICINE

## 2021-04-06 PROCEDURE — 85025 COMPLETE CBC W/AUTO DIFF WBC: CPT

## 2021-04-06 PROCEDURE — 84443 ASSAY THYROID STIM HORMONE: CPT

## 2021-04-06 PROCEDURE — 82607 VITAMIN B-12: CPT

## 2021-04-06 PROCEDURE — 0001A PFIZER SARS-COV-2 VACCINE: CPT | Performed by: INTERNAL MEDICINE

## 2021-04-06 PROCEDURE — 80061 LIPID PANEL: CPT

## 2021-04-06 PROCEDURE — 83036 HEMOGLOBIN GLYCOSYLATED A1C: CPT

## 2021-04-07 LAB
EST. AVERAGE GLUCOSE BLD GHB EST-MCNC: 120 MG/DL
HBA1C MFR BLD: 5.8 % (ref 4–5.6)
TSH SERPL DL<=0.005 MIU/L-ACNC: 1.31 UIU/ML (ref 0.38–5.33)
VIT B12 SERPL-MCNC: 430 PG/ML (ref 211–911)

## 2021-04-08 DIAGNOSIS — E55.9 VITAMIN D DEFICIENCY: ICD-10-CM

## 2021-04-08 LAB — 25(OH)D3 SERPL-MCNC: 11 NG/ML (ref 30–80)

## 2021-04-08 RX ORDER — ERGOCALCIFEROL 1.25 MG/1
50000 CAPSULE ORAL
Qty: 12 CAPSULE | Refills: 0 | Status: SHIPPED | OUTPATIENT
Start: 2021-04-08 | End: 2021-08-18

## 2021-04-08 NOTE — ASSESSMENT & PLAN NOTE
Results for DANIKA SALVADOR (MRN [id number]) as of 4/8/2021 13:50   Ref. Range 4/6/2021 08:06   25-Hydroxy   Vitamin D 25 Latest Ref Range: 30 - 80 ng/mL 11 (L)

## 2021-04-22 ENCOUNTER — HOSPITAL ENCOUNTER (OUTPATIENT)
Dept: RADIOLOGY | Facility: MEDICAL CENTER | Age: 61
End: 2021-04-22
Attending: NURSE PRACTITIONER
Payer: COMMERCIAL

## 2021-04-22 DIAGNOSIS — E04.9 ENLARGED THYROID GLAND: ICD-10-CM

## 2021-04-22 PROCEDURE — 76536 US EXAM OF HEAD AND NECK: CPT

## 2021-04-24 ENCOUNTER — IMMUNIZATION (OUTPATIENT)
Dept: FAMILY PLANNING/WOMEN'S HEALTH CLINIC | Facility: IMMUNIZATION CENTER | Age: 61
End: 2021-04-24
Payer: COMMERCIAL

## 2021-04-24 DIAGNOSIS — Z23 ENCOUNTER FOR VACCINATION: Primary | ICD-10-CM

## 2021-04-24 PROCEDURE — 91300 PFIZER SARS-COV-2 VACCINE: CPT

## 2021-04-24 PROCEDURE — 0002A PFIZER SARS-COV-2 VACCINE: CPT

## 2021-05-31 ENCOUNTER — PATIENT MESSAGE (OUTPATIENT)
Dept: MEDICAL GROUP | Facility: PHYSICIAN GROUP | Age: 61
End: 2021-05-31

## 2021-06-01 NOTE — PATIENT COMMUNICATION
Phone Number Called: [phone] (home)     I called the patient in response to her message regarding a COVID test for travel.  I explained that [facility] is not doing COVID tests for travel.  The patient was referred to the pharmacies and health department for testing.  The patient verbalized understanding and had no further questions.

## 2021-08-18 ENCOUNTER — OFFICE VISIT (OUTPATIENT)
Dept: MEDICAL GROUP | Facility: PHYSICIAN GROUP | Age: 61
End: 2021-08-18
Payer: COMMERCIAL

## 2021-08-18 VITALS
DIASTOLIC BLOOD PRESSURE: 78 MMHG | RESPIRATION RATE: 16 BRPM | WEIGHT: 166 LBS | TEMPERATURE: 98.7 F | OXYGEN SATURATION: 98 % | SYSTOLIC BLOOD PRESSURE: 120 MMHG | HEART RATE: 76 BPM | HEIGHT: 67 IN | BODY MASS INDEX: 26.06 KG/M2

## 2021-08-18 DIAGNOSIS — R09.82 PND (POST-NASAL DRIP): ICD-10-CM

## 2021-08-18 DIAGNOSIS — S76.112A QUADRICEPS MUSCLE STRAIN, LEFT, INITIAL ENCOUNTER: ICD-10-CM

## 2021-08-18 PROBLEM — M79.604 RIGHT LEG PAIN: Status: ACTIVE | Noted: 2021-08-18

## 2021-08-18 PROBLEM — J02.9 SORE THROAT: Status: ACTIVE | Noted: 2021-08-18

## 2021-08-18 LAB
INT CON NEG: NEGATIVE
INT CON POS: POSITIVE
S PYO AG THROAT QL: NEGATIVE

## 2021-08-18 PROCEDURE — 87880 STREP A ASSAY W/OPTIC: CPT | Performed by: NURSE PRACTITIONER

## 2021-08-18 PROCEDURE — 99213 OFFICE O/P EST LOW 20 MIN: CPT | Performed by: NURSE PRACTITIONER

## 2021-08-18 ASSESSMENT — FIBROSIS 4 INDEX: FIB4 SCORE: 1.91

## 2021-08-19 NOTE — ASSESSMENT & PLAN NOTE
Acute medical problem. She has been having sore throat for the last 18 days. She has associated fatigue and cough. The cough is dry and nonproductive. The cough occurs throughout the day, worse at night.  She states that she did completed a [employer] Covid test that was negative. Denies fever, chills, chest pain, shortness of breath, sick contact, ear pain, no issues with swallowing, no drooling, itchy eyes, watery eyes or nasal congestion.

## 2021-08-19 NOTE — ASSESSMENT & PLAN NOTE
Acute medical problem. She has been having right leg pain that started 6 weeks ago. The pain is intermittent and worse at night.  The pain does wake her up from sleep. The pain extends from her right hip down to her right knee. The pain is described as sharp. Intensity 6/10.  Laying down at nighttime exacerbates.  She has not noticed any alleviating factors.  She was cleaning her house prior to the injury but states that she did not do any new cleaning and she normally does. She has not tried any interventions. Denies fall, trauma, surgery, numbness, tingling, dysuria, hematuria, abdominal pain, nausea, vomiting chest pain or shortness of breath.

## 2021-08-19 NOTE — PROGRESS NOTES
Subjective:     CC: Leg pain leg pain and sore throat    HPI:   Susy presents today with the following:     Sore throat  Acute medical problem. She has been having sore throat for the last 18 days. She has associated fatigue and cough. The cough is dry and nonproductive. The cough occurs throughout the day, worse at night.  She states that she did completed a [facility] Covid test that was negative. Denies fever, chills, chest pain, shortness of breath, sick contact, ear pain, no issues with swallowing, no drooling, itchy eyes, watery eyes or nasal congestion.    Right leg pain  Acute medical problem. She has been having right leg pain that started 6 weeks ago. The pain is intermittent and worse at night.  The pain does wake her up from sleep. The pain extends from her right hip down to her right knee. The pain is described as sharp. Intensity 6/10.  Laying down at nighttime exacerbates.  She has not noticed any alleviating factors.  She was cleaning her house prior to the injury but states that she did not do any new cleaning and she normally does. She has not tried any interventions. Denies fall, trauma, surgery, numbness, tingling, dysuria, hematuria, abdominal pain, nausea, vomiting chest pain or shortness of breath.      Past Medical History:   Diagnosis Date   • Enlarged thyroid gland 2021   • Hyperlipidemia    • Prediabetes    • Urinary tract infection        Social History     Tobacco Use   • Smoking status: Former Smoker     Packs/day: 0.25     Years: 4.00     Pack years: 1.00     Quit date: 1986     Years since quittin.3   • Smokeless tobacco: Never Used   • Tobacco comment: smoked 4 years in college, 3 per day social   Vaping Use   • Vaping Use: Never used   Substance Use Topics   • Alcohol use: Not Currently     Comment: 2 glasses red wine per month   • Drug use: Not Currently       No current [facility]-ordered outpatient medications on file.     No current [facility]-ordered facility-administered  "medications on file.       Allergies:  Patient has no known allergies.    Health Maintenance: Deferred    ROS:  Per HPI    Objective:     Vital signs reviewed  Exam:  /78 (BP Location: Left arm, Patient Position: Sitting, BP Cuff Size: Adult)   Pulse 76   Temp 37.1 °C (98.7 °F) (Temporal)   Resp 16   Ht 1.702 m (5' 7\")   Wt 75.3 kg (166 lb)   SpO2 98%   BMI 26.00 kg/m²  Body mass index is 26 kg/m².      General: Normal appearing. No distress.  HENT: Normocephalic. Ears normal shape and contour, canals are clear bilaterally, tympanic membranes are benign, oropharynx is with erythema and cobblestoning. No edema or exudates. Tonsils 2+.  No maxillary or frontal sinus tenderness.  Eyes: Eyes conjunctiva clear lids without ptosis,  lids normal.  Pulmonary: Clear to ausculation.  Normal effort. No rales, ronchi, or wheezing.  Cardiovascular: Regular rate and rhythm without murmur.   Abdomen: Soft, nontender, nondistended. Normal bowel sounds. Liver and spleen are not palpable.  No pain on palpation.  No suprapubic tenderness or right lower quadrant or right groin tenderness.  Neurologic: Grossly nonfocal  Lymph: No supraclavicular lymph nodes are palpable. cervical lymph nodes palpable bilaterally.   Skin: Warm and dry.  No obvious lesions.  Musculoskeletal: Normal gait. No extremity cyanosis, clubbing, or edema.  Left hip no pain on palpation, range of motion intact, negative straight leg raise test, logroll test, CR, FADIR.  Right hip no pain on palpation, range of motion intact, negative straight leg test, logroll test, CR, FADIR.  Tender to palpation in right quadriceps.  No bruising or swelling noted.  Psych: Normal mood and affect. Alert and oriented x3. Judgment and insight is normal.    Results for DANIKA SALVADOR (MRN [id number]) as of 8/18/2021 17:45   Ref. Range 8/18/2021 17:20   Rapid Strep Screen Unknown Negative       Assessment & Plan:     61 y.o. female with the following -     1. Quadriceps " muscle strain, left, initial encounter  Acute uncomplicated problem.  Hip and abdominal exam benign today.  Given her tenderness to the right quadriceps suspect this is more muscular strain in nature.  We discussed taking anti-inflammatories, resting, icing, using heat to the area.  We discussed stretching exercises.  We discussed that if not improved to return to clinic.  She verbalized understanding.    2. PND (post-nasal drip)  Acute uncomplicated problem.  Strep test is negative today.  Her assessment this postnasal drip.  We discussed trying over-the-counter Flonase and over-the-counter daily allergy medication.  Red flags discussed.  - POCT Rapid Strep A        Return if symptoms worsen or fail to improve.    Please note that this dictation was created using voice recognition software. I have made every reasonable attempt to correct obvious errors, but I expect that there are errors of grammar and possibly content that I did not discover before finalizing the note.

## 2021-08-27 ENCOUNTER — OFFICE VISIT (OUTPATIENT)
Dept: URGENT CARE | Facility: CLINIC | Age: 61
End: 2021-08-27
Payer: COMMERCIAL

## 2021-08-27 VITALS
RESPIRATION RATE: 16 BRPM | TEMPERATURE: 98.6 F | DIASTOLIC BLOOD PRESSURE: 82 MMHG | BODY MASS INDEX: 26.06 KG/M2 | SYSTOLIC BLOOD PRESSURE: 128 MMHG | HEIGHT: 67 IN | HEART RATE: 76 BPM | WEIGHT: 166 LBS | OXYGEN SATURATION: 97 %

## 2021-08-27 DIAGNOSIS — R05.9 COUGH: ICD-10-CM

## 2021-08-27 DIAGNOSIS — B97.89 VIRAL RESPIRATORY INFECTION: ICD-10-CM

## 2021-08-27 DIAGNOSIS — J98.8 VIRAL RESPIRATORY INFECTION: ICD-10-CM

## 2021-08-27 DIAGNOSIS — R09.81 NASAL CONGESTION: ICD-10-CM

## 2021-08-27 PROCEDURE — 99213 OFFICE O/P EST LOW 20 MIN: CPT | Performed by: NURSE PRACTITIONER

## 2021-08-27 RX ORDER — ALBUTEROL SULFATE 90 UG/1
1-2 AEROSOL, METERED RESPIRATORY (INHALATION) EVERY 4 HOURS PRN
Qty: 8.5 G | Refills: 0 | Status: SHIPPED | OUTPATIENT
Start: 2021-08-27 | End: 2021-11-22

## 2021-08-27 RX ORDER — BENZONATATE 100 MG/1
100 CAPSULE ORAL 3 TIMES DAILY PRN
Qty: 30 CAPSULE | Refills: 0 | Status: SHIPPED | OUTPATIENT
Start: 2021-08-27 | End: 2021-11-22

## 2021-08-27 RX ORDER — FLUTICASONE PROPIONATE 50 MCG
1 SPRAY, SUSPENSION (ML) NASAL DAILY
Qty: 16 G | Refills: 0 | Status: SHIPPED | OUTPATIENT
Start: 2021-08-27 | End: 2021-11-22

## 2021-08-27 ASSESSMENT — FIBROSIS 4 INDEX: FIB4 SCORE: 1.91

## 2021-08-27 ASSESSMENT — ENCOUNTER SYMPTOMS
FEVER: 0
SORE THROAT: 1
SHORTNESS OF BREATH: 0
HEADACHES: 0
WHEEZING: 1
COUGH: 1
HEMOPTYSIS: 0

## 2021-08-27 NOTE — PROGRESS NOTES
Subjective:     Susy Wheeler is a 61 y.o. female who presents for Cough (x1 week, productive ) and Sore Throat      Cough for 3 weeks. Seen 1 week ago, negative strep. Negative COVID 2 week agos. Slight wheeze. Green sputum. Mild sore throat.     Cough  This is a new problem. The current episode started in the past 7 days. The problem has been waxing and waning. Associated symptoms include a sore throat and wheezing. Pertinent negatives include no chest pain, fever, headaches, hemoptysis or shortness of breath. Her past medical history is significant for bronchitis. There is no history of pneumonia.       Past Medical History:   Diagnosis Date   • Enlarged thyroid gland 2021   • Hyperlipidemia    • Prediabetes    • Urinary tract infection        Past Surgical History:   Procedure Laterality Date   • GYN SURGERY      total hysterectomy, cervix removed       Social History     Socioeconomic History   • Marital status:      Spouse name: Not on file   • Number of children: Not on file   • Years of education: Not on file   • Highest education level: Bachelor's degree (e.g., BA, AB, BS)   Occupational History   • Not on file   Tobacco Use   • Smoking status: Former Smoker     Packs/day: 0.25     Years: 4.00     Pack years: 1.00     Quit date: 1986     Years since quittin.4   • Smokeless tobacco: Never Used   • Tobacco comment: smoked 4 years in college, 3 per day social   Vaping Use   • Vaping Use: Never used   Substance and Sexual Activity   • Alcohol use: Not Currently     Comment: 2 glasses red wine per month   • Drug use: Not Currently   • Sexual activity: Yes     Partners: Male   Other Topics Concern   • Not on file   Social History Narrative   • Not on file     Social Determinants of Health     Financial Resource Strain: Low Risk    • Difficulty of Paying Living Expenses: Not hard at all   Food Insecurity: No Food Insecurity   • Worried About Running Out of Food in the Last Year: Never  "true   • Ran Out of Food in the Last Year: Never true   Transportation Needs: No Transportation Needs   • Lack of Transportation (Medical): No   • Lack of Transportation (Non-Medical): No   Physical Activity: Insufficiently Active   • Days of Exercise per Week: 1 day   • Minutes of Exercise per Session: 10 min   Stress: No Stress Concern Present   • Feeling of Stress : Only a little   Social Connections: Moderately Isolated   • Frequency of Communication with Friends and Family: More than three times a week   • Frequency of Social Gatherings with Friends and Family: Once a week   • Attends Taoist Services: Never   • Active Member of Clubs or Organizations: No   • Attends Club or Organization Meetings: Never   • Marital Status:    Intimate Partner Violence:    • Fear of Current or Ex-Partner:    • Emotionally Abused:    • Physically Abused:    • Sexually Abused:         Family History   Problem Relation Age of Onset   • COPD Mother    • Hypertension Father    • Stroke Father    • Hypertension Sister    • Hypertension Brother    • Arthritis Brother    • COPD Brother    • Cancer Paternal Aunt         cervix cancer   • Cancer Maternal Grandmother         chin and mouth   • Heart Disease Maternal Grandfather    • Hypertension Brother    • Anxiety disorder Brother    • Hypertension Sister    • Cancer Sister         Pancreatic cancer   • Cancer Paternal Aunt         breast        No Known Allergies    Review of Systems   Constitutional: Negative for fever.   HENT: Positive for sore throat.    Respiratory: Positive for cough, sputum production and wheezing. Negative for hemoptysis and shortness of breath.    Cardiovascular: Negative for chest pain and palpitations.   Neurological: Negative for headaches.   All other systems reviewed and are negative.       Objective:   /82   Pulse 76   Temp 37 °C (98.6 °F) (Temporal)   Resp 16   Ht 1.702 m (5' 7\")   Wt 75.3 kg (166 lb)   SpO2 97%   BMI 26.00 kg/m² "     Physical Exam  Vitals reviewed.   Constitutional:       General: She is not in acute distress.     Appearance: She is well-developed.   HENT:      Head: Normocephalic and atraumatic.      Right Ear: Tympanic membrane, ear canal and external ear normal.      Left Ear: Tympanic membrane, ear canal and external ear normal.      Nose: Mucosal edema and congestion present.      Mouth/Throat:      Lips: Pink.      Mouth: Mucous membranes are moist.      Pharynx: Posterior oropharyngeal erythema present.      Tonsils: No tonsillar exudate. 1+ on the right. 1+ on the left.      Comments: Cobblestoning, clear post nasal drip.   Eyes:      Conjunctiva/sclera: Conjunctivae normal.   Cardiovascular:      Rate and Rhythm: Normal rate.   Pulmonary:      Effort: Pulmonary effort is normal. No respiratory distress.      Breath sounds: Normal breath sounds. No stridor. No wheezing, rhonchi or rales.      Comments: Persistent cough noted.   Musculoskeletal:         General: Normal range of motion.      Cervical back: Normal range of motion and neck supple.   Skin:     General: Skin is warm and dry.      Findings: No rash.   Neurological:      Mental Status: She is alert and oriented to person, place, and time.      GCS: GCS eye subscore is 4. GCS verbal subscore is 5. GCS motor subscore is 6.   Psychiatric:         Mood and Affect: Mood normal.         Speech: Speech normal.         Behavior: Behavior normal.         Thought Content: Thought content normal.         Judgment: Judgment normal.         Assessment/Plan:   1. Viral respiratory infection  - benzonatate (TESSALON) 100 MG Cap; Take 1 Capsule by mouth 3 times a day as needed for Cough.  Dispense: 30 Capsule; Refill: 0  - albuterol 108 (90 Base) MCG/ACT Aero Soln inhalation aerosol; Inhale 1-2 Puffs every four hours as needed for Shortness of Breath.  Dispense: 8.5 g; Refill: 0    2. Nasal congestion  - fluticasone (FLONASE) 50 MCG/ACT nasal spray; Administer 1 Spray into  affected nostril(S) every day.  Dispense: 16 g; Refill: 0    3. Cough  - benzonatate (TESSALON) 100 MG Cap; Take 1 Capsule by mouth 3 times a day as needed for Cough.  Dispense: 30 Capsule; Refill: 0  - albuterol 108 (90 Base) MCG/ACT Aero Soln inhalation aerosol; Inhale 1-2 Puffs every four hours as needed for Shortness of Breath.  Dispense: 8.5 g; Refill: 0  - fluticasone (FLONASE) 50 MCG/ACT nasal spray; Administer 1 Spray into affected nostril(S) every day.  Dispense: 16 g; Refill: 0  Symptomatic care.  -Oral hydration and rest.   -Cough control: nonpharmacologic options for cough relief such as throat lozenges, hot tea, honey.  -Over the counter expectorant as directed; Guaifenesin (Mucinex).  -Tylenol or ibuprofen for pain and fever as directed.   -Albuterol inhaler as directed.    Follow up with PCP. Follow up for increased or persistent shortness of breath or wheezing, fevers, elevated heart rate, weakness, prolonged cough, chest pain, or any other concerns.    No current wheezing, hx bronchitis. Contingent inhaler.-Discussed viral etiology of Bronchitis. S&S of PNA with follow up. Stable vitals.     Differential diagnosis, natural history, supportive care, and indications for immediate follow-up discussed.

## 2021-08-28 NOTE — PATIENT INSTRUCTIONS
Symptomatic care.  -Oral hydration and rest.   -Cough control: nonpharmacologic options for cough relief such as throat lozenges, hot tea, honey.  -Over the counter expectorant as directed; Guaifenesin (Mucinex).  -Tylenol or ibuprofen for pain and fever as directed.   -Albuterol inhaler as directed.    Follow up with PCP. Follow up for increased or persistent shortness of breath or wheezing, fevers, elevated heart rate, weakness, prolonged cough, chest pain, or any other concerns.      Viral Respiratory Infection  A respiratory infection is an illness that affects part of the respiratory system, such as the lungs, nose, or throat. A respiratory infection that is caused by a virus is called a viral respiratory infection.  Common types of viral respiratory infections include:  · A cold.  · The flu (influenza).  · A respiratory syncytial virus (RSV) infection.  What are the causes?  This condition is caused by a virus.  What are the signs or symptoms?  Symptoms of this condition include:  · A stuffy or runny nose.  · Yellow or green nasal discharge.  · A cough.  · Sneezing.  · Fatigue.  · Achy muscles.  · A sore throat.  · Sweating or chills.  · A fever.  · A headache.  How is this diagnosed?  This condition may be diagnosed based on:  · Your symptoms.  · A physical exam.  · Testing of nasal swabs.  How is this treated?  This condition may be treated with medicines, such as:  · Antiviral medicine. This may shorten the length of time a person has symptoms.  · Expectorants. These make it easier to cough up mucus.  · Decongestant nasal sprays.  · Acetaminophen or NSAIDs to relieve fever and pain.  Antibiotic medicines are not prescribed for viral infections. This is because antibiotics are designed to kill bacteria. They are not effective against viruses.  Follow these instructions at home:    Managing pain and congestion  · Take over-the-counter and prescription medicines only as told by your health care provider.  · If  you have a sore throat, gargle with a salt-water mixture 3-4 times a day or as needed. To make a salt-water mixture, completely dissolve ½-1 tsp of salt in 1 cup of warm water.  · Use nose drops made from salt water to ease congestion and soften raw skin around your nose.  · Drink enough fluid to keep your urine pale yellow. This helps prevent dehydration and helps loosen up mucus.  General instructions  · Rest as much as possible.  · Do not drink alcohol.  · Do not use any products that contain nicotine or tobacco, such as cigarettes and e-cigarettes. If you need help quitting, ask your health care provider.  · Keep all follow-up visits as told by your health care provider. This is important.  How is this prevented?    · Get an annual flu shot. You may get the flu shot in late summer, fall, or winter. Ask your health care provider when you should get your flu shot.  · Avoid exposing others to your respiratory infection.  ? Stay home from work or school as told by your health care provider.  ? Wash your hands with soap and water often, especially after you cough or sneeze. If soap and water are not available, use alcohol-based hand .  · Avoid contact with people who are sick during cold and flu season. This is generally fall and winter.  Contact a health care provider if:  · Your symptoms last for 10 days or longer.  · Your symptoms get worse over time.  · You have a fever.  · You have severe sinus pain in your face or forehead.  · The glands in your jaw or neck become very swollen.  Get help right away if you:  · Feel pain or pressure in your chest.  · Have shortness of breath.  · Faint or feel like you will faint.  · Have severe and persistent vomiting.  · Feel confused or disoriented.  Summary  · A respiratory infection is an illness that affects part of the respiratory system, such as the lungs, nose, or throat. A respiratory infection that is caused by a virus is called a viral respiratory  infection.  · Common types of viral respiratory infections are a cold, influenza, and respiratory syncytial virus (RSV) infection.  · Symptoms of this condition include a stuffy or runny nose, cough, sneezing, fatigue, achy muscles, sore throat, and fevers or chills.  · Antibiotic medicines are not prescribed for viral infections. This is because antibiotics are designed to kill bacteria. They are not effective against viruses.  This information is not intended to replace advice given to you by your health care provider. Make sure you discuss any questions you have with your health care provider.  Document Released: 09/27/2006 Document Revised: 12/26/2019 Document Reviewed: 01/28/2019  e|tab Patient Education © 2020 [employer]      Bronchitis  Bronchitis is the body's way of reacting to injury and/or infection (inflammation) of the bronchi. Bronchi are the air tubes that extend from the windpipe into the lungs. If the inflammation becomes severe, it may cause shortness of breath.  CAUSES   Inflammation may be caused by:  · A virus.  · Germs (bacteria).  · Dust.  · Allergens.  · Pollutants and many other irritants.  The cells lining the bronchial tree are covered with tiny hairs (cilia). These constantly beat upward, away from the lungs, toward the mouth. This keeps the lungs free of pollutants. When these cells become too irritated and are unable to do their job, mucus begins to develop. This causes the characteristic cough of bronchitis. The cough clears the lungs when the cilia are unable to do their job. Without either of these protective mechanisms, the mucus would settle in the lungs. Then you would develop pneumonia.  Smoking is a common cause of bronchitis and can contribute to pneumonia. Stopping this habit is the single most important thing you can do to help yourself.  TREATMENT   · Your caregiver may prescribe an antibiotic if the cough is caused by bacteria. Also, medicines that open up your  airways make it easier to breathe. Your caregiver may also recommend or prescribe an expectorant. It will loosen the mucus to be coughed up. Only take over-the-counter or prescription medicines for pain, discomfort, or fever as directed by your caregiver.  · Removing whatever causes the problem (smoking, for example) is critical to preventing the problem from getting worse.  · Cough suppressants may be prescribed for relief of cough symptoms.  · Inhaled medicines may be prescribed to help with symptoms now and to help prevent problems from returning.  · For those with recurrent (chronic) bronchitis, there may be a need for steroid medicines.  SEEK IMMEDIATE MEDICAL CARE IF:   · During treatment, you develop more pus-like mucus (purulent sputum).  · You have a fever.  · Your baby is older than 3 months with a rectal temperature of 102° F (38.9° C) or higher.  · Your baby is 3 months old or younger with a rectal temperature of 100.4° F (38° C) or higher.  · You become progressively more ill.  · You have increased difficulty breathing, wheezing, or shortness of breath.  It is necessary to seek immediate medical care if you are elderly or sick from any other disease.  MAKE SURE YOU:   · Understand these instructions.  · Will watch your condition.  · Will get help right away if you are not doing well or get worse.  Document Released: 12/18/2006 Document Revised: 03/11/2013 Document Reviewed: 10/27/2009  [employer]® Patient Information ©2014 [employer].

## 2021-09-05 ASSESSMENT — ENCOUNTER SYMPTOMS
PALPITATIONS: 0
SPUTUM PRODUCTION: 1

## 2021-11-21 SDOH — ECONOMIC STABILITY: FOOD INSECURITY: WITHIN THE PAST 12 MONTHS, YOU WORRIED THAT YOUR FOOD WOULD RUN OUT BEFORE YOU GOT MONEY TO BUY MORE.: NEVER TRUE

## 2021-11-21 SDOH — ECONOMIC STABILITY: INCOME INSECURITY: HOW HARD IS IT FOR YOU TO PAY FOR THE VERY BASICS LIKE FOOD, HOUSING, MEDICAL CARE, AND HEATING?: NOT HARD AT ALL

## 2021-11-21 SDOH — ECONOMIC STABILITY: TRANSPORTATION INSECURITY
IN THE PAST 12 MONTHS, HAS LACK OF TRANSPORTATION KEPT YOU FROM MEETINGS, WORK, OR FROM GETTING THINGS NEEDED FOR DAILY LIVING?: NO

## 2021-11-21 SDOH — HEALTH STABILITY: PHYSICAL HEALTH: ON AVERAGE, HOW MANY MINUTES DO YOU ENGAGE IN EXERCISE AT THIS LEVEL?: 0 MIN

## 2021-11-21 SDOH — ECONOMIC STABILITY: FOOD INSECURITY: WITHIN THE PAST 12 MONTHS, THE FOOD YOU BOUGHT JUST DIDN'T LAST AND YOU DIDN'T HAVE MONEY TO GET MORE.: NEVER TRUE

## 2021-11-21 SDOH — ECONOMIC STABILITY: HOUSING INSECURITY: IN THE LAST 12 MONTHS, HOW MANY PLACES HAVE YOU LIVED?: 1

## 2021-11-21 SDOH — ECONOMIC STABILITY: INCOME INSECURITY: IN THE LAST 12 MONTHS, WAS THERE A TIME WHEN YOU WERE NOT ABLE TO PAY THE MORTGAGE OR RENT ON TIME?: NO

## 2021-11-21 SDOH — HEALTH STABILITY: PHYSICAL HEALTH: ON AVERAGE, HOW MANY DAYS PER WEEK DO YOU ENGAGE IN MODERATE TO STRENUOUS EXERCISE (LIKE A BRISK WALK)?: 0 DAYS

## 2021-11-21 ASSESSMENT — LIFESTYLE VARIABLES
HOW MANY STANDARD DRINKS CONTAINING ALCOHOL DO YOU HAVE ON A TYPICAL DAY: 1 OR 2
HOW OFTEN DO YOU HAVE SIX OR MORE DRINKS ON ONE OCCASION: NEVER
HOW OFTEN DO YOU HAVE A DRINK CONTAINING ALCOHOL: 2-4 TIMES A MONTH

## 2021-11-21 ASSESSMENT — SOCIAL DETERMINANTS OF HEALTH (SDOH)
IN A TYPICAL WEEK, HOW MANY TIMES DO YOU TALK ON THE PHONE WITH FAMILY, FRIENDS, OR NEIGHBORS?: MORE THAN THREE TIMES A WEEK
DO YOU BELONG TO ANY CLUBS OR ORGANIZATIONS SUCH AS CHURCH GROUPS UNIONS, FRATERNAL OR ATHLETIC GROUPS, OR SCHOOL GROUPS?: NO
HOW OFTEN DO YOU ATTENT MEETINGS OF THE CLUB OR ORGANIZATION YOU BELONG TO?: 1 TO 4 TIMES PER YEAR
HOW OFTEN DO YOU ATTEND CHURCH OR RELIGIOUS SERVICES?: NEVER
HOW HARD IS IT FOR YOU TO PAY FOR THE VERY BASICS LIKE FOOD, HOUSING, MEDICAL CARE, AND HEATING?: NOT HARD AT ALL
HOW OFTEN DO YOU HAVE A DRINK CONTAINING ALCOHOL: 2-4 TIMES A MONTH
HOW MANY DRINKS CONTAINING ALCOHOL DO YOU HAVE ON A TYPICAL DAY WHEN YOU ARE DRINKING: 1 OR 2
IN A TYPICAL WEEK, HOW MANY TIMES DO YOU TALK ON THE PHONE WITH FAMILY, FRIENDS, OR NEIGHBORS?: MORE THAN THREE TIMES A WEEK
HOW OFTEN DO YOU ATTEND CHURCH OR RELIGIOUS SERVICES?: NEVER
HOW OFTEN DO YOU ATTENT MEETINGS OF THE CLUB OR ORGANIZATION YOU BELONG TO?: 1 TO 4 TIMES PER YEAR
HOW OFTEN DO YOU GET TOGETHER WITH FRIENDS OR RELATIVES?: THREE TIMES A WEEK
DO YOU BELONG TO ANY CLUBS OR ORGANIZATIONS SUCH AS CHURCH GROUPS UNIONS, FRATERNAL OR ATHLETIC GROUPS, OR SCHOOL GROUPS?: NO
WITHIN THE PAST 12 MONTHS, YOU WORRIED THAT YOUR FOOD WOULD RUN OUT BEFORE YOU GOT THE MONEY TO BUY MORE: NEVER TRUE
HOW OFTEN DO YOU GET TOGETHER WITH FRIENDS OR RELATIVES?: THREE TIMES A WEEK
HOW OFTEN DO YOU HAVE SIX OR MORE DRINKS ON ONE OCCASION: NEVER

## 2021-11-22 ENCOUNTER — OFFICE VISIT (OUTPATIENT)
Dept: MEDICAL GROUP | Facility: PHYSICIAN GROUP | Age: 61
End: 2021-11-22
Payer: COMMERCIAL

## 2021-11-22 VITALS
DIASTOLIC BLOOD PRESSURE: 82 MMHG | HEIGHT: 67 IN | RESPIRATION RATE: 18 BRPM | HEART RATE: 62 BPM | BODY MASS INDEX: 25.93 KG/M2 | TEMPERATURE: 98.1 F | WEIGHT: 165.2 LBS | OXYGEN SATURATION: 98 % | SYSTOLIC BLOOD PRESSURE: 116 MMHG

## 2021-11-22 DIAGNOSIS — Z80.0 FAMILY HISTORY OF PANCREATIC CANCER: ICD-10-CM

## 2021-11-22 DIAGNOSIS — R73.03 PREDIABETES: ICD-10-CM

## 2021-11-22 DIAGNOSIS — H92.02 EAR DISCOMFORT, LEFT: ICD-10-CM

## 2021-11-22 DIAGNOSIS — H66.90 ACUTE OTITIS MEDIA, UNSPECIFIED OTITIS MEDIA TYPE: ICD-10-CM

## 2021-11-22 DIAGNOSIS — E04.1 RIGHT THYROID NODULE: ICD-10-CM

## 2021-11-22 DIAGNOSIS — E78.2 MIXED HYPERLIPIDEMIA: ICD-10-CM

## 2021-11-22 PROCEDURE — 99213 OFFICE O/P EST LOW 20 MIN: CPT | Performed by: INTERNAL MEDICINE

## 2021-11-22 RX ORDER — AMOXICILLIN AND CLAVULANATE POTASSIUM 875; 125 MG/1; MG/1
1 TABLET, FILM COATED ORAL 2 TIMES DAILY
Qty: 20 TABLET | Refills: 0 | Status: SHIPPED | OUTPATIENT
Start: 2021-11-22 | End: 2021-12-21

## 2021-11-22 ASSESSMENT — FIBROSIS 4 INDEX: FIB4 SCORE: 1.91

## 2021-11-22 NOTE — ASSESSMENT & PLAN NOTE
Patient had thyroid ultrasound done April 2021 was noted to have right thyroid nodule.  Patient stated that she has not had any follow-up or referral to see endocrinology.

## 2021-11-22 NOTE — ASSESSMENT & PLAN NOTE
This is a new condition noted since the last few weeks per patient report.  Patient reported that she thought she may have earwax.  She has tried to use over-the-counter earwax removal without improvement.  Patient actually noted slight increase in left ear discomfort after the procedure on her own.  Patient denies any fever or chills.  No significant change in her hearing.

## 2021-11-22 NOTE — PROGRESS NOTES
"PRIMARY CARE CLINIC VISIT  Chief Complaint   Patient presents with   • [facility]     Family history of pancreatic cancer    History of Present Illness     Ear discomfort, left  This is a new condition noted since the last few weeks per patient report.  Patient reported that she thought she may have earwax.  She has tried to use over-the-counter earwax removal without improvement.  Patient actually noted slight increase in left ear discomfort after the procedure on her own.  Patient denies any fever or chills.  No significant change in her hearing.    Prediabetes  Chronic condition.  Patient currently on diet therapy.  Patient is due for lab test.    Mixed hyperlipidemia  Chronic condition.  The patient not on medical therapy.  Lab tests requested for follow-up.    Right thyroid nodule  Patient had thyroid ultrasound done April 2021 was noted to have right thyroid nodule.  Patient stated that she has not had any follow-up or referral to see endocrinology.    Family history of pancreatic cancer  Patient reported that her sister was diagnosed with pancreatic cancer.  The patient is very concerned and she requests a referral to genetics for further counseling/evaluation.  Patient reported intermittent abdominal discomfort.  No nausea vomiting GI bleeding or any change in bowel movement.  No history of unexplained weight loss.      No current outpatient medications on file prior to visit.     No current facility-administered medications on file prior to visit.        Allergies: Patient has no known allergies.    ROS  As per HPI above. All other systems reviewed and negative.      Past Medical, Social, and Family history reviewed and updated in EPIC     Objective     /82 (BP Location: Left arm, Patient Position: Sitting, BP Cuff Size: Adult)   Pulse 62   Temp 36.7 °C (98.1 °F) (Temporal)   Resp 18   Ht 1.702 m (5' 7\")   Wt 74.9 kg (165 lb 3.2 oz)   SpO2 98%    Body mass index is 25.87 kg/m².    General: " alert and oriented  Cardiovascular: regular rate and rhythm  Pulmonary: lungs : no wheezing or rales  Gastrointestinal: BS present. No obvious mass noted  Left ear examination the external canal is clear tympanic membrane is intact.  However there is moderate erythema noted in the tympanic membrane associated with mild retraction  Nose no purulent drainage noted  Oropharynx no exudates        Assessment and Plan     1. Ear discomfort, left  2. Acute otitis media, unspecified otitis media type  Recommend Augmentin twice daily for 10 days.  Recommend office follow-up in 10 to 14 days    3. Family history of pancreatic cancer  As above the patient reported sister with pancreatic cancer.  This provider order CA 19-9 and order an MRI for further evaluation.  Patient was also referred to genetics per patient request.  - CA 19-9; Future  - MR-ABDOMEN-WITH & W/O; Future  - Referral to Genetics  Recommend follow-up after the above are done.    4. Mixed hyperlipidemia  Lab tests ordered.  Recommend low-fat low-cholesterol diet.  - ALANINE AMINO-TRANS; Future  - Lipid Profile; Future    5. Prediabetes  Lab tests requested.  Recommend low sweet low-carb diet and regular exercise activities.  - Basic Metabolic Panel; Future  - HEMOGLOBIN A1C; Future    6. Right thyroid nodule    - Referral to Endocrinology  - TSH; Future  - FREE THYROXINE; Future  - TRIIDOTHYRONINE; Future          -If any problems should arise, patient was advised to contact our office for followup or go to ER to be evaluated.      Healthcare maintenance     Health Maintenance Due   Topic Date Due   • COLORECTAL CANCER SCREENING  Never done   • IMM ZOSTER VACCINES (1 of 2) Never done   • MAMMOGRAM  07/15/2020   • IMM INFLUENZA (1) 09/01/2021   • COVID-19 Vaccine (3 - Booster for Pfizer series) 10/24/2021             Please note that this dictation was created using voice recognition software. I have made every reasonable attempt to correct obvious errors, but  it is possible there are errors of grammar and possibly content that I did not discover before finalizing the note.      Francisco Helton MD  Internal Medicine  [facility]

## 2021-11-22 NOTE — ASSESSMENT & PLAN NOTE
Patient reported that her sister was diagnosed with pancreatic cancer.  The patient is very concerned and she requests a referral to genetics for further counseling/evaluation.  Patient reported intermittent abdominal discomfort.  No nausea vomiting GI bleeding or any change in bowel movement.  No history of unexplained weight loss.

## 2021-12-07 ENCOUNTER — APPOINTMENT (OUTPATIENT)
Dept: MEDICAL GROUP | Facility: PHYSICIAN GROUP | Age: 61
End: 2021-12-07
Payer: COMMERCIAL

## 2021-12-10 ENCOUNTER — HOSPITAL ENCOUNTER (OUTPATIENT)
Dept: RADIOLOGY | Facility: MEDICAL CENTER | Age: 61
End: 2021-12-10
Attending: NURSE PRACTITIONER
Payer: COMMERCIAL

## 2021-12-10 DIAGNOSIS — Z12.31 ENCOUNTER FOR SCREENING MAMMOGRAM FOR BREAST CANCER: ICD-10-CM

## 2021-12-10 PROCEDURE — 77063 BREAST TOMOSYNTHESIS BI: CPT

## 2021-12-11 DIAGNOSIS — R92.8 ABNORMAL MAMMOGRAM: ICD-10-CM

## 2021-12-13 ENCOUNTER — TELEPHONE (OUTPATIENT)
Dept: MEDICAL GROUP | Facility: PHYSICIAN GROUP | Age: 61
End: 2021-12-13

## 2021-12-13 NOTE — TELEPHONE ENCOUNTER
----- Message from Francisco Helton M.D. sent at 12/13/2021 11:35 AM PST -----  Please notify patient about their result(s):    Recent mammogram showed calcifications noted on the left breast needing further investigation    Recommendation: we have ordered Diagnostic mammogram of Left breast for further evaluation.  Please call [facility] [phone] to schedule the appointment.   Please followup with Dr Helton after imagings done.

## 2021-12-16 ENCOUNTER — HOSPITAL ENCOUNTER (OUTPATIENT)
Dept: LAB | Facility: MEDICAL CENTER | Age: 61
End: 2021-12-16
Attending: INTERNAL MEDICINE
Payer: COMMERCIAL

## 2021-12-16 DIAGNOSIS — E78.2 MIXED HYPERLIPIDEMIA: ICD-10-CM

## 2021-12-16 DIAGNOSIS — E04.1 RIGHT THYROID NODULE: ICD-10-CM

## 2021-12-16 DIAGNOSIS — R73.03 PREDIABETES: ICD-10-CM

## 2021-12-16 DIAGNOSIS — Z80.0 FAMILY HISTORY OF PANCREATIC CANCER: ICD-10-CM

## 2021-12-16 LAB
ALT SERPL-CCNC: 20 U/L (ref 2–50)
ANION GAP SERPL CALC-SCNC: 9 MMOL/L (ref 7–16)
BUN SERPL-MCNC: 15 MG/DL (ref 8–22)
CALCIUM SERPL-MCNC: 9.3 MG/DL (ref 8.5–10.5)
CANCER AG19-9 SERPL-ACNC: 16 U/ML (ref 0–35)
CHLORIDE SERPL-SCNC: 107 MMOL/L (ref 96–112)
CHOLEST SERPL-MCNC: 196 MG/DL (ref 100–199)
CO2 SERPL-SCNC: 26 MMOL/L (ref 20–33)
CREAT SERPL-MCNC: 0.77 MG/DL (ref 0.5–1.4)
EST. AVERAGE GLUCOSE BLD GHB EST-MCNC: 111 MG/DL
FASTING STATUS PATIENT QL REPORTED: NORMAL
GLUCOSE SERPL-MCNC: 94 MG/DL (ref 65–99)
HBA1C MFR BLD: 5.5 % (ref 4–5.6)
HDLC SERPL-MCNC: 68 MG/DL
LDLC SERPL CALC-MCNC: 103 MG/DL
POTASSIUM SERPL-SCNC: 4.4 MMOL/L (ref 3.6–5.5)
SODIUM SERPL-SCNC: 142 MMOL/L (ref 135–145)
T3 SERPL-MCNC: 129 NG/DL (ref 60–181)
T4 FREE SERPL-MCNC: 1.38 NG/DL (ref 0.93–1.7)
TRIGL SERPL-MCNC: 127 MG/DL (ref 0–149)
TSH SERPL DL<=0.005 MIU/L-ACNC: 0.86 UIU/ML (ref 0.38–5.33)

## 2021-12-16 PROCEDURE — 84460 ALANINE AMINO (ALT) (SGPT): CPT

## 2021-12-16 PROCEDURE — 80061 LIPID PANEL: CPT

## 2021-12-16 PROCEDURE — 84443 ASSAY THYROID STIM HORMONE: CPT

## 2021-12-16 PROCEDURE — 36415 COLL VENOUS BLD VENIPUNCTURE: CPT

## 2021-12-16 PROCEDURE — 86301 IMMUNOASSAY TUMOR CA 19-9: CPT

## 2021-12-16 PROCEDURE — 83036 HEMOGLOBIN GLYCOSYLATED A1C: CPT

## 2021-12-16 PROCEDURE — 84480 ASSAY TRIIODOTHYRONINE (T3): CPT

## 2021-12-16 PROCEDURE — 84439 ASSAY OF FREE THYROXINE: CPT

## 2021-12-16 PROCEDURE — 80048 BASIC METABOLIC PNL TOTAL CA: CPT

## 2021-12-20 ENCOUNTER — HOSPITAL ENCOUNTER (OUTPATIENT)
Dept: RADIOLOGY | Facility: MEDICAL CENTER | Age: 61
End: 2021-12-20
Payer: COMMERCIAL

## 2021-12-21 ENCOUNTER — OFFICE VISIT (OUTPATIENT)
Dept: MEDICAL GROUP | Facility: PHYSICIAN GROUP | Age: 61
End: 2021-12-21
Payer: COMMERCIAL

## 2021-12-21 ENCOUNTER — HOSPITAL ENCOUNTER (OUTPATIENT)
Facility: MEDICAL CENTER | Age: 61
End: 2021-12-21
Attending: INTERNAL MEDICINE
Payer: COMMERCIAL

## 2021-12-21 VITALS
RESPIRATION RATE: 18 BRPM | DIASTOLIC BLOOD PRESSURE: 62 MMHG | WEIGHT: 166 LBS | BODY MASS INDEX: 26.06 KG/M2 | HEIGHT: 67 IN | HEART RATE: 60 BPM | OXYGEN SATURATION: 97 % | TEMPERATURE: 97.3 F | SYSTOLIC BLOOD PRESSURE: 110 MMHG

## 2021-12-21 DIAGNOSIS — R05.9 COUGH: ICD-10-CM

## 2021-12-21 DIAGNOSIS — H92.02 EAR DISCOMFORT, LEFT: ICD-10-CM

## 2021-12-21 LAB
COVID ORDER STATUS COVID19: NORMAL
FLUAV+FLUBV AG SPEC QL IA: NORMAL
INT CON NEG: NEGATIVE
INT CON NEG: NEGATIVE
INT CON POS: POSITIVE
INT CON POS: POSITIVE
S PYO AG THROAT QL: NORMAL

## 2021-12-21 PROCEDURE — 99213 OFFICE O/P EST LOW 20 MIN: CPT | Performed by: INTERNAL MEDICINE

## 2021-12-21 PROCEDURE — U0005 INFEC AGEN DETEC AMPLI PROBE: HCPCS

## 2021-12-21 PROCEDURE — 87804 INFLUENZA ASSAY W/OPTIC: CPT | Performed by: INTERNAL MEDICINE

## 2021-12-21 PROCEDURE — U0003 INFECTIOUS AGENT DETECTION BY NUCLEIC ACID (DNA OR RNA); SEVERE ACUTE RESPIRATORY SYNDROME CORONAVIRUS 2 (SARS-COV-2) (CORONAVIRUS DISEASE [COVID-19]), AMPLIFIED PROBE TECHNIQUE, MAKING USE OF HIGH THROUGHPUT TECHNOLOGIES AS DESCRIBED BY CMS-2020-01-R: HCPCS

## 2021-12-21 PROCEDURE — 87880 STREP A ASSAY W/OPTIC: CPT | Performed by: INTERNAL MEDICINE

## 2021-12-21 RX ORDER — BENZONATATE 100 MG/1
100 CAPSULE ORAL 3 TIMES DAILY PRN
Qty: 60 CAPSULE | Refills: 0 | Status: SHIPPED | OUTPATIENT
Start: 2021-12-21 | End: 2022-03-29

## 2021-12-21 ASSESSMENT — FIBROSIS 4 INDEX: FIB4 SCORE: 2.13

## 2021-12-21 NOTE — PROGRESS NOTES
"PRIMARY CARE CLINIC VISIT  Chief Complaint   Patient presents with   • Follow-Up   Cough sore throat      History of Present Illness     Cough  New condition    Pt reported symptoms started: Since yesterday    Current symptoms:  Cough, sorethroat.  Patient denies exposure to anyone with known Covid infection recently  Fever/chills: denies    SOB /wheezing:denies    Covid vaccine status: 2nd dose pfizer April 2021        No current outpatient medications on file prior to visit.     No current facility-administered medications on file prior to visit.        Allergies: Patient has no known allergies.    ROS  As per HPI above. All other systems reviewed and negative.      Past Medical, Social, and Family history reviewed and updated in EPIC     Objective     /62 (BP Location: Right arm, Patient Position: Sitting, BP Cuff Size: Adult)   Pulse 60   Temp 36.3 °C (97.3 °F) (Temporal)   Resp 18   Ht 1.702 m (5' 7\")   Wt 75.3 kg (166 lb)   SpO2 97%    Body mass index is 26 kg/m².    General: alert and oriented  Cardiovascular: regular rate and rhythm  Pulmonary: lungs : no wheezing   Gastrointestinal: BS present. No obvious mass noted  Left ear exam tympanic membrane intact no significant redness or effusion noted at the present time.        Assessment and Plan     1. Cough/sore throat  Rule out viral syndrome.  Rule out Covid infection versus others  - POCT Influenza A/B  - POCT Rapid Strep A  - SARS-CoV-2, PCR (In-House): Collect NP OR nasal swab in VTM; Future    - Advised pt to stay well hydrated and plenty of rest  - Winston Med squeeze bottle sinus rinses twice a day as needed  - Warm tea + honey + fresh lemon juice  - Chicken noodle soup  - Throat Coat as needed  - May try otc tylenol 500mg tid prn for headaches, fever or pain [if pt is not allergic to acetaminophen].   May try otc mucinex as needed [expectorant]  Prescription written for Tessalon 100 mg p.o. 3 times daily painful cough.    - Return to clinic " if not better. Go to urgent care or ER is symptoms worsen /change such as temperature >101, worsening shortness of breath, wheezing, cough, etc...           -If any problems should arise, patient was advised to contact our office for followup or go to ER to be evaluated.    #2.  Abnormal mammogram.  Recent mammogram showed   IMPRESSION:     Tiny faint calcifications lower inner quadrant left breast which cannot be well-characterized on the C view images. Left diagnostic mammogram recommended.        R0- Category 0:  Need Additional Imaging Evaluation and/or Prior I have order diagnostic left-sided mammogram to be done.    I have informed the patient that I will be out of the office from this evening until January 10, 2022.  After diagnostic mammogram done >>advised the patient to schedule a follow-up appointment to see one of my colleagues at this clinic to go over the results of the mammogram/follow-up.  The patient voiced understanding.         Healthcare maintenance     Health Maintenance Due   Topic Date Due   • COLORECTAL CANCER SCREENING  Never done   • IMM ZOSTER VACCINES (1 of 2) Never done   • IMM INFLUENZA (1) 09/01/2021   • COVID-19 Vaccine (3 - Booster for Pfizer series) 10/24/2021                Please note that this dictation was created using voice recognition software. I have made every reasonable attempt to correct obvious errors but there may be errors of grammar and content that I may have overlooked prior to finalization of this note.      Francisco Helton MD  Internal Medicine  [facility] care [facility]

## 2021-12-21 NOTE — ASSESSMENT & PLAN NOTE
Patient has completed the antibiotic.  The patient has noted improvement patient denies fever or chills.

## 2021-12-21 NOTE — ASSESSMENT & PLAN NOTE
New condition    Pt reported symptoms started: Since yesterday    Current symptoms:  Cough, sorethroat.  Patient denies exposure to anyone with known Covid infection recently  Fever/chills: denies    SOB /wheezing:denies    Covid vaccine status: 2nd dose pfizer April 2021

## 2021-12-22 LAB
SARS-COV-2 RNA RESP QL NAA+PROBE: NOTDETECTED
SPECIMEN SOURCE: NORMAL

## 2021-12-23 ENCOUNTER — HOSPITAL ENCOUNTER (OUTPATIENT)
Dept: RADIOLOGY | Facility: MEDICAL CENTER | Age: 61
End: 2021-12-23
Attending: INTERNAL MEDICINE
Payer: COMMERCIAL

## 2021-12-23 DIAGNOSIS — R92.8 ABNORMAL FINDINGS ON DIAGNOSTIC IMAGING OF BREAST: ICD-10-CM

## 2021-12-23 DIAGNOSIS — R92.8 ABNORMAL MAMMOGRAM: ICD-10-CM

## 2021-12-23 LAB — PATHOLOGY CONSULT NOTE: NORMAL

## 2021-12-23 PROCEDURE — 77065 DX MAMMO INCL CAD UNI: CPT | Mod: LT

## 2021-12-23 PROCEDURE — 88305 TISSUE EXAM BY PATHOLOGIST: CPT

## 2021-12-24 DIAGNOSIS — R92.8 ABNORMAL MAMMOGRAM: ICD-10-CM

## 2021-12-27 ENCOUNTER — TELEPHONE (OUTPATIENT)
Dept: RADIOLOGY | Facility: MEDICAL CENTER | Age: 61
End: 2021-12-27

## 2022-01-01 NOTE — TELEPHONE ENCOUNTER
----- Message from Francisco Helton M.D. sent at 12/11/2021  9:50 AM PST -----  Pls notify pt    Recent mammogram showed    Tiny  calcifications  on left breast needing further investigation.. Left diagnostic mammogram has been ordered.  Please call [facility] [phone] to schedule the appointment.   Rec pt to ashley ballard pcp after imaging done     ----- Message -----  From: Intf, Radiant In  Sent: 12/10/2021   4:30 PM PST  To: Francisco Helton M.D.       2016

## 2022-03-29 ENCOUNTER — OFFICE VISIT (OUTPATIENT)
Dept: ENDOCRINOLOGY | Facility: MEDICAL CENTER | Age: 62
End: 2022-03-29
Attending: NURSE PRACTITIONER
Payer: COMMERCIAL

## 2022-03-29 VITALS
BODY MASS INDEX: 25.9 KG/M2 | HEIGHT: 67 IN | OXYGEN SATURATION: 96 % | HEART RATE: 76 BPM | SYSTOLIC BLOOD PRESSURE: 124 MMHG | DIASTOLIC BLOOD PRESSURE: 78 MMHG | WEIGHT: 165 LBS

## 2022-03-29 DIAGNOSIS — E55.9 VITAMIN D DEFICIENCY: ICD-10-CM

## 2022-03-29 DIAGNOSIS — E04.1 RIGHT THYROID NODULE: Chronic | ICD-10-CM

## 2022-03-29 PROCEDURE — 99211 OFF/OP EST MAY X REQ PHY/QHP: CPT | Performed by: NURSE PRACTITIONER

## 2022-03-29 PROCEDURE — 99214 OFFICE O/P EST MOD 30 MIN: CPT | Performed by: NURSE PRACTITIONER

## 2022-03-29 ASSESSMENT — FIBROSIS 4 INDEX: FIB4 SCORE: 2.13

## 2022-03-29 NOTE — PROGRESS NOTES
Chief Complaint: Consult requested by Francisco Helton M.D. for evaluation of Thyroid Nodule.     HPI:     Susy Salvador is a 61 y.o. female with Thyroid Nodule diagnosed 04/22/2021 and is here for initial evaluation.      Thyroid US from 04/22/2021was reviewed and discussed with the patient in detail.  This showed: FINDINGS: The thyroid gland is homogeneous.Vascularity is normal. The right lobe of the thyroid gland measures 2.44 cm x 5.25 cm x 2.10 cm. The left lobe of the thyroid gland measures 2.18 cm x 5.98 cm x 1.79 cm. The isthmus measures 0.35 cm.    Nodule #1 Location:  Right  lower, Size:  1.35 cm x 1.32 cm x 0.80 cm, Composition:  Solid-2, Echogenicity:  Hypoechoic-2, Shape:  Wider than tall-0, Margins:  Smooth-0, Echogenic Foci:  None-0    No previous ultrasound of the neck was completed prior patient reports it was ordered as primary care provider examined her neck and found enlarged thyroid gland.    She denies prior thyroid biopsy and denies prior thyroid surgery.    She denies lumps or enlargement in the neck.   Patient denies dysphagia, shortness of breath or anterior neck tenderness.     Patient denies family history of thyroid related disease process.    She denies a history of radiation exposure to head or neck.  She denies history of taking thyroid medications.     Thyroid function is within normal limits.     Ref. Range 12/16/2021 10:57   TSH Latest Ref Range: 0.380 - 5.330 uIU/mL 0.860   Free T-4 Latest Ref Range: 0.93 - 1.70 ng/dL 1.38   T3 Latest Ref Range: 60.0 - 181.0 ng/dL 129.0       Vitamin D deficiency.  Patient is not currently taking any vitamin D supplementation.  Results for SUSY SALVADOR (MRN [id number]) as of 3/29/2022 09:48   Ref. Range 4/6/2021 08:06   25-Hydroxy   Vitamin D 25 Latest Ref Range: 30 - 80 ng/mL 11 (L)       Patient's medications, allergies, and social histories were reviewed and updated as appropriate.      ROS:      CONS:     No fever, no chills, no weight loss, no  fatigue   EYES:      No diplopia, no blurry vision, no redness of eyes, no swelling of eyelids   ENT:    No hearing loss, No ear pain, No sore throat, no dysphagia, no neck swelling   CV:     No chest pain, no palpitations, no claudication, no orthopnea, no PND   PULM:    No SOB, no cough, no hemoptysis, no wheezing    GI:   No nausea, no vomiting, no diarrhea, no constipation, no bloody stools   :  Passing urine well, no dysuria, no hematuria   ENDO:   No polyuria, no polydipsia, no heat intolerance, no cold intolerance   NEURO: No headaches, no dizziness, no convulsions, no tremors   MUSC:  No joint swellings, no arthralgias, no myalgias, no weakness   SKIN:   No rash, no ulcers, no dry skin   PSYCH:   No depression, no anxiety, no difficulty sleeping       Past Medical History:  Patient Active Problem List    Diagnosis Date Noted   • Cough 12/21/2021   • Abnormal mammogram 12/11/2021   • Ear discomfort, left 11/22/2021   • Otitis media 11/22/2021   • Family history of pancreatic cancer 11/22/2021   • Right leg pain 08/18/2021   • Vitamin D deficiency 04/08/2021   • Right thyroid nodule 04/05/2021   • Mixed hyperlipidemia 07/16/2019   • Prediabetes 07/16/2019       Past Surgical History:  Past Surgical History:   Procedure Laterality Date   • GYN SURGERY  2001    total hysterectomy, cervix removed        Allergies:  Patient has no known allergies.     Current Medications:    Current Outpatient Medications:   •  benzonatate (TESSALON) 100 MG Cap, Take 1 Capsule by mouth 3 times a day as needed for Cough., Disp: 60 Capsule, Rfl: 0    Social History:  Social History     Socioeconomic History   • Marital status:      Spouse name: Not on file   • Number of children: Not on file   • Years of education: Not on file   • Highest education level: Bachelor's degree (e.g., BA, AB, BS)   Occupational History   • Not on file   Tobacco Use   • Smoking status: Former Smoker     Packs/day: 0.25     Years: 4.00     Pack  years: 1.00     Quit date: 1986     Years since quittin.0   • Smokeless tobacco: Never Used   • Tobacco comment: smoked 4 years in college, 3 per day social   Vaping Use   • Vaping Use: Never used   Substance and Sexual Activity   • Alcohol use: Yes     Comment: socially - wine   • Drug use: Not Currently   • Sexual activity: Not on file   Other Topics Concern   • Not on file   Social History Narrative   • Not on file     Social Determinants of Health     Financial Resource Strain: Low Risk    • Difficulty of Paying Living Expenses: Not hard at all   Food Insecurity: No Food Insecurity   • Worried About Running Out of Food in the Last Year: Never true   • Ran Out of Food in the Last Year: Never true   Transportation Needs: No Transportation Needs   • Lack of Transportation (Medical): No   • Lack of Transportation (Non-Medical): No   Physical Activity: Inactive   • Days of Exercise per Week: 0 days   • Minutes of Exercise per Session: 0 min   Stress: No Stress Concern Present   • Feeling of Stress : Only a little   Social Connections: Moderately Integrated   • Frequency of Communication with Friends and Family: More than three times a week   • Frequency of Social Gatherings with Friends and Family: Three times a week   • Attends Rastafarian Services: Never   • Active Member of Clubs or Organizations: No   • Attends Club or Organization Meetings: 1 to 4 times per year   • Marital Status:    Intimate Partner Violence: Not on file   Housing Stability: Low Risk    • Unable to Pay for Housing in the Last Year: No   • Number of Places Lived in the Last Year: 1   • Unstable Housing in the Last Year: No        Family History:   Family History   Problem Relation Age of Onset   • COPD Mother    • Hypertension Father    • Stroke Father    • Hypertension Sister    • Hypertension Brother    • Arthritis Brother    • COPD Brother    • Cancer Paternal Aunt         cervix cancer   • Cancer Maternal Grandmother          "chin and mouth   • Heart Disease Maternal Grandfather    • Hypertension Brother    • Anxiety disorder Brother    • Hypertension Sister    • Cancer Sister         Pancreatic cancer   • Cancer Paternal Aunt         breast         PHYSICAL EXAM:   Vital signs: /78 (BP Location: Right arm, Patient Position: Sitting, BP Cuff Size: Adult)   Pulse 76   Ht 1.702 m (5' 7\")   Wt 74.8 kg (165 lb)   SpO2 96%   BMI 25.84 kg/m²   GENERAL: Well-developed, well-nourished  in no apparent distress.   EYE: No ocular and eyelid asymmetry, Anicteric sclerae,  PERRL, No exophthalmos or lidlag  HENT: Hearing grossly intact, Normocephalic, atraumatic. Pink, moist mucous membranes, No exudate  NECK: Supple. Trachea midline.  Adipose related pouch above right clavicular line.  Denies tenderness with palpation.  CARDIOVASCULAR: Regular rate and rhythm. No murmurs, rubs, or gallops.   LUNGS: Clear to auscultation bilaterally   ABDOMEN: Soft, nontender with positive bowel sounds.   EXTREMITIES: No clubbing, cyanosis, or edema.   NEUROLOGICAL: Cranial nerves II-XII are grossly intact   Symmetric reflexes at the patella no proximal muscle weakness, No visible tremor with both outstretched hands  LYMPH: No cervical, supraclavicular,  adenopathy palpated.   SKIN: No rashes, lesions. Turgor is normal.    ASSESSMENT/PLAN:     1. Right thyroid nodule  Stable.  Recommend follow-up point-of-care ultrasound with Dr. Rivera.  Well differentiated thyroid nodule, hypoechoic, wider than taller.     No further thyroid function testing is required at this time.    2.  Vitamin D deficiency  Unstable.  Recommend taking vitamin D3 5000 IU daily.    Next appointment with Dr. Rivera to do a point-of-care ultrasound at the earliest available appointment.    Thank you kindly for allowing me to participate in the thyroid care plan for this patient.    Marisela Stock, KAILA  03/29/22    CC:   Francisco Helton M.D.  "

## 2022-04-25 ENCOUNTER — PROCEDURE VISIT (OUTPATIENT)
Dept: ENDOCRINOLOGY | Facility: MEDICAL CENTER | Age: 62
End: 2022-04-25
Attending: NURSE PRACTITIONER
Payer: COMMERCIAL

## 2022-04-25 DIAGNOSIS — E04.1 RIGHT THYROID NODULE: Chronic | ICD-10-CM

## 2022-04-25 PROCEDURE — 76536 US EXAM OF HEAD AND NECK: CPT | Performed by: INTERNAL MEDICINE

## 2022-04-28 PROBLEM — K63.5 COLON POLYPS: Status: ACTIVE | Noted: 2022-04-28

## 2022-04-28 PROBLEM — K63.5 COLON POLYPS: Chronic | Status: ACTIVE | Noted: 2022-04-28

## 2022-06-27 ENCOUNTER — PROCEDURE VISIT (OUTPATIENT)
Dept: ENDOCRINOLOGY | Facility: MEDICAL CENTER | Age: 62
End: 2022-06-27
Attending: INTERNAL MEDICINE
Payer: COMMERCIAL

## 2022-06-27 ENCOUNTER — HOSPITAL ENCOUNTER (OUTPATIENT)
Facility: MEDICAL CENTER | Age: 62
End: 2022-06-27
Attending: INTERNAL MEDICINE
Payer: COMMERCIAL

## 2022-06-27 DIAGNOSIS — E04.1 RIGHT THYROID NODULE: Chronic | ICD-10-CM

## 2022-06-27 DIAGNOSIS — E55.9 VITAMIN D DEFICIENCY: ICD-10-CM

## 2022-06-27 LAB — PATHOLOGY CONSULT NOTE: NORMAL

## 2022-06-27 PROCEDURE — 10005 FNA BX W/US GDN 1ST LES: CPT | Performed by: INTERNAL MEDICINE

## 2022-06-27 PROCEDURE — 88112 CYTOPATH CELL ENHANCE TECH: CPT

## 2022-06-27 NOTE — PROCEDURES
Ultrasound-guided biopsy of right lower lobe nodule was completed in the office today    Please see procedure note    Patient was given lab orders and was advised to follow-up next year in 2022 around January to February with labs prior    We will update patient on biopsy results    Heri Garza M.D.

## 2022-09-01 ENCOUNTER — OFFICE VISIT (OUTPATIENT)
Dept: MEDICAL GROUP | Facility: PHYSICIAN GROUP | Age: 62
End: 2022-09-01
Payer: COMMERCIAL

## 2022-09-01 VITALS
BODY MASS INDEX: 25.58 KG/M2 | SYSTOLIC BLOOD PRESSURE: 112 MMHG | OXYGEN SATURATION: 99 % | RESPIRATION RATE: 16 BRPM | TEMPERATURE: 98.8 F | DIASTOLIC BLOOD PRESSURE: 70 MMHG | HEART RATE: 65 BPM | WEIGHT: 163 LBS | HEIGHT: 67 IN

## 2022-09-01 DIAGNOSIS — R73.03 PREDIABETES: Chronic | ICD-10-CM

## 2022-09-01 DIAGNOSIS — R92.8 ABNORMAL MAMMOGRAM: ICD-10-CM

## 2022-09-01 DIAGNOSIS — E78.2 MIXED HYPERLIPIDEMIA: ICD-10-CM

## 2022-09-01 PROCEDURE — 99214 OFFICE O/P EST MOD 30 MIN: CPT | Performed by: INTERNAL MEDICINE

## 2022-09-01 ASSESSMENT — PATIENT HEALTH QUESTIONNAIRE - PHQ9: CLINICAL INTERPRETATION OF PHQ2 SCORE: 0

## 2022-09-01 ASSESSMENT — FIBROSIS 4 INDEX: FIB4 SCORE: 2.17

## 2022-09-01 NOTE — PROGRESS NOTES
"PRIMARY CARE CLINIC VISIT  Chief Complaint   Patient presents with    Follow-Up     Chief complaint: I need a referral to general surgery for follow-up and the order for left breast diagnostic mammogram and ultrasound    History of Present Illness     Abnormal mammogram  Patient underwent left breast biopsy December 2021    A. Left breast calcifications lower inner, stereotactic biopsy:          Benign fibrofatty breast tissue with a discrete nodule of           fibrosis and chronic inflammation with calcifications within           the fibrosis.          No atypia or malignancy identified.     The result discussed with the patient.  The patient stated that she is due for a follow-up left breast diagnostic mammogram and ultrasound and an update referral to see her surgeon Dr. Margaux Zepeda  Request for the studies and the referral submitted for the patient today.  Patient denies any new breast lesion or lump.    Mixed hyperlipidemia  Patient presently on diet therapy.  Lab test requested for follow-up.    Prediabetes  Patient on diet therapy.  Lab tests ordered for follow-up    No current outpatient medications on file prior to visit.     No current facility-administered medications on file prior to visit.        Allergies: Patient has no known allergies.    ROS  As per HPI above. All other systems reviewed and negative.      Past Medical, Social, and Family history reviewed and updated in EPIC     Objective     /70 (BP Location: Left arm, Patient Position: Sitting, BP Cuff Size: Adult)   Pulse 65   Temp 37.1 °C (98.8 °F) (Temporal)   Resp 16   Ht 1.702 m (5' 7\")   Wt 73.9 kg (163 lb)   SpO2 99%    Body mass index is 25.53 kg/m².    General: alert in no apparent distress.  Cardiovascular: regular rate and rhythm  Pulmonary: lungs : no wheezing   Gastrointestinal: BS present. No obvious mass noted          Assessment and Plan     1. Abnormal mammogram  - Referral to General Surgery  - MA-DIAGNOSTIC MAMMO LEFT " W/TOMOSYNTHESIS W/CAD; Future  - US-BREAST LIMITED-LEFT; Future    2. Mixed hyperlipidemia  - Lipid Profile; Future    3. Prediabetes  - HEMOGLOBIN A1C; Future  - Basic Metabolic Panel; Future    Recommended diet and exercise.  Recommend follow-up after lab test done                    Healthcare Maintenance     Health Maintenance Due   Topic Date Due    IMM HEP B VACCINE (1 of 3 - 3-dose series) Never done    IMM INFLUENZA (1) 09/01/2022               Please note that this dictation was created using voice recognition software. I have made every reasonable attempt to correct obvious errors, but I expect that there are errors of grammar and possibly content that I did not discover before finalizing the note.    Francisco Helton MD  Internal Medicine  [place] [facility]

## 2022-09-01 NOTE — ASSESSMENT & PLAN NOTE
Patient underwent left breast biopsy December 2021    A. Left breast calcifications lower inner, stereotactic biopsy:          Benign fibrofatty breast tissue with a discrete nodule of           fibrosis and chronic inflammation with calcifications within           the fibrosis.          No atypia or malignancy identified.     The result discussed with the patient.  The patient stated that she is due for a follow-up left breast diagnostic mammogram and ultrasound and an update referral to see her surgeon Dr. Margaux Zepeda  Request for the studies and the referral submitted for the patient today.  Patient denies any new breast lesion or lump.

## 2022-10-04 ENCOUNTER — APPOINTMENT (OUTPATIENT)
Dept: RADIOLOGY | Facility: MEDICAL CENTER | Age: 62
End: 2022-10-04
Attending: INTERNAL MEDICINE
Payer: COMMERCIAL

## 2022-10-12 ENCOUNTER — HOSPITAL ENCOUNTER (OUTPATIENT)
Dept: RADIOLOGY | Facility: MEDICAL CENTER | Age: 62
End: 2022-10-12
Attending: INTERNAL MEDICINE
Payer: COMMERCIAL

## 2022-10-12 DIAGNOSIS — R92.8 ABNORMAL MAMMOGRAM: ICD-10-CM

## 2022-10-12 PROCEDURE — G0279 TOMOSYNTHESIS, MAMMO: HCPCS

## 2023-03-06 ENCOUNTER — OFFICE VISIT (OUTPATIENT)
Dept: URGENT CARE | Facility: CLINIC | Age: 63
End: 2023-03-06
Payer: COMMERCIAL

## 2023-03-06 VITALS
TEMPERATURE: 98.2 F | WEIGHT: 165 LBS | DIASTOLIC BLOOD PRESSURE: 82 MMHG | RESPIRATION RATE: 16 BRPM | HEIGHT: 67 IN | BODY MASS INDEX: 25.9 KG/M2 | SYSTOLIC BLOOD PRESSURE: 128 MMHG | HEART RATE: 82 BPM | OXYGEN SATURATION: 95 %

## 2023-03-06 DIAGNOSIS — R05.1 ACUTE COUGH: ICD-10-CM

## 2023-03-06 DIAGNOSIS — J22 ACUTE RESPIRATORY INFECTION: ICD-10-CM

## 2023-03-06 PROCEDURE — 99214 OFFICE O/P EST MOD 30 MIN: CPT | Performed by: NURSE PRACTITIONER

## 2023-03-06 RX ORDER — DOXYCYCLINE HYCLATE 100 MG
100 TABLET ORAL 2 TIMES DAILY
Qty: 20 TABLET | Refills: 0 | Status: SHIPPED | OUTPATIENT
Start: 2023-03-06 | End: 2023-03-13

## 2023-03-06 RX ORDER — BENZONATATE 100 MG/1
100 CAPSULE ORAL 3 TIMES DAILY PRN
Qty: 60 CAPSULE | Refills: 0 | Status: SHIPPED | OUTPATIENT
Start: 2023-03-06 | End: 2023-04-14

## 2023-03-06 ASSESSMENT — FIBROSIS 4 INDEX: FIB4 SCORE: 2.17

## 2023-03-07 NOTE — PROGRESS NOTES
"Susy Wheeler is a 62 y.o. female who presents for Cough (X2 weeks, \" X4 days, cough is getting worse, and I am now producing phlegm.\" )      HPI  This is a new problem. Susy Wheeler is a 62 y.o. patient who presents to urgent care with c/o: 2 weeks of coughing and congestion. Really bad in the past 4 days. Feeling chilled. Productive cough of green/ dark yellow sputum. Feels mildly sob when coughing. Ears hurt a little from coughing. Has intermittent headache.   Treatments tried: otc medications   Denies fever, orthopnea, c/p, leg swelling, sore throat, nasal drainage.   No other aggravating or alleviating factors.       ROS See HPI    Allergies:     No Known Allergies    PMSFS Hx:  Past Medical History:   Diagnosis Date    Enlarged thyroid gland 2021    Hyperlipidemia     Prediabetes     Urinary tract infection      Past Surgical History:   Procedure Laterality Date    GYN SURGERY      total hysterectomy, cervix removed     Family History   Problem Relation Age of Onset    COPD Mother     Hypertension Father     Stroke Father     Hypertension Sister     Hypertension Brother     Arthritis Brother     COPD Brother     Cancer Paternal Aunt         cervix cancer    Cancer Maternal Grandmother         chin and mouth    Heart Disease Maternal Grandfather     Hypertension Brother     Anxiety disorder Brother     Hypertension Sister     Cancer Sister         Pancreatic cancer    Cancer Paternal Aunt         breast     Social History     Tobacco Use    Smoking status: Former     Packs/day: 0.25     Years: 4.00     Pack years: 1.00     Types: Cigarettes     Quit date: 1986     Years since quittin.9    Smokeless tobacco: Never    Tobacco comments:     smoked 4 years in college, 3 per day social   Substance Use Topics    Alcohol use: Yes     Comment: socially - wine       Problems:   Patient Active Problem List   Diagnosis    Mixed hyperlipidemia    Prediabetes    Right thyroid nodule    Vitamin D deficiency " "   Right leg pain    Ear discomfort, left    Otitis media    Family history of pancreatic cancer    Abnormal mammogram    Cough    Colon polyps       Medications:   No current outpatient medications on file prior to visit.     No current facility-administered medications on file prior to visit.          Objective:     /82 (BP Location: Right arm, Patient Position: Sitting, BP Cuff Size: Adult)   Pulse 82   Temp 36.8 °C (98.2 °F) (Temporal)   Resp 16   Ht 1.702 m (5' 7\")   Wt 74.8 kg (165 lb)   SpO2 95%   BMI 25.84 kg/m²     Physical Exam  Vitals and nursing note reviewed.   Constitutional:       General: She is not in acute distress.     Appearance: Normal appearance. She is well-developed and well-groomed. She is not ill-appearing or toxic-appearing.   HENT:      Head: Normocephalic.      Right Ear: Hearing normal. No middle ear effusion. Tympanic membrane is injected. Tympanic membrane is not erythematous.      Left Ear: Hearing normal.  No middle ear effusion. Tympanic membrane is injected. Tympanic membrane is not erythematous.      Nose: No mucosal edema or rhinorrhea.      Right Sinus: No maxillary sinus tenderness or frontal sinus tenderness.      Left Sinus: No maxillary sinus tenderness or frontal sinus tenderness.      Mouth/Throat:      Pharynx: Uvula midline. No posterior oropharyngeal erythema.      Tonsils: No tonsillar abscesses.   Neck:      Trachea: Trachea normal.   Cardiovascular:      Rate and Rhythm: Normal rate and regular rhythm.      Chest Wall: PMI is not displaced.      Pulses: Normal pulses.      Heart sounds: Normal heart sounds.   Pulmonary:      Effort: Pulmonary effort is normal. No respiratory distress.      Breath sounds: Rhonchi present. No decreased breath sounds, wheezing or rales.   Musculoskeletal:         General: Normal range of motion.      Cervical back: Full passive range of motion without pain, normal range of motion and neck supple.   Lymphadenopathy:      " Cervical: No cervical adenopathy.      Upper Body:      Right upper body: No supraclavicular adenopathy.      Left upper body: No supraclavicular adenopathy.   Skin:     General: Skin is warm and dry.      Capillary Refill: Capillary refill takes less than 2 seconds.   Neurological:      Mental Status: She is alert and oriented to person, place, and time.      Gait: Gait normal.   Psychiatric:         Mood and Affect: Mood normal.         Speech: Speech normal.         Behavior: Behavior normal. Behavior is cooperative.         Assessment /Associated Orders:      1. Acute respiratory infection  doxycycline (VIBRAMYCIN) 100 MG Tab      2. Acute cough  benzonatate (TESSALON) 100 MG Cap            Medical Decision Making:    Pt is clinically stable at today's acute urgent care visit.  No acute distress noted. Appropriate for outpatient care at this time.   Acute problem today .   Educated in proper administration of  prescription medication(s) ordered today including safety, possible SE, risks, benefits, rationale and alternatives to therapy.   Keep well hydrated      Discussed Dx, management options (risks,benefits, and alternatives to planned treatment), natural progression and supportive care.  Expressed understanding and the treatment plan was agreed upon.   Questions were encouraged and answered   Return to urgent care prn if new or worsening sx or if there is no improvement in condition prn.    Educated in Red flags and indications to immediately call 911 or present to the Emergency Department.       Time I spent evaluating Susy Wheeler in urgent care today was 33  minutes. This time includes preparing for visit, reviewing any pertinent notes or test results, counseling/education, exam, obtaining HPI, interpretation of lab tests, medication management and documentation as indicated above.Time does not include separately billable procedures noted .       Please note that this dictation was created using voice  recognition software. I have worked with consultants from the vendor as well as technical experts from [facility] to optimize the interface. I have made every reasonable attempt to correct obvious errors, but I expect that there are errors of grammar and possibly content that I did not discover before finalizing the note.  This note was electronically signed by provider

## 2023-03-13 ENCOUNTER — OFFICE VISIT (OUTPATIENT)
Dept: MEDICAL GROUP | Facility: PHYSICIAN GROUP | Age: 63
End: 2023-03-13
Payer: COMMERCIAL

## 2023-03-13 VITALS
WEIGHT: 162 LBS | HEART RATE: 78 BPM | OXYGEN SATURATION: 99 % | DIASTOLIC BLOOD PRESSURE: 82 MMHG | SYSTOLIC BLOOD PRESSURE: 124 MMHG | BODY MASS INDEX: 25.43 KG/M2 | HEIGHT: 67 IN | TEMPERATURE: 96.8 F

## 2023-03-13 DIAGNOSIS — R05.9 COUGH IN ADULT: ICD-10-CM

## 2023-03-13 DIAGNOSIS — R11.0 NAUSEA: ICD-10-CM

## 2023-03-13 DIAGNOSIS — J22 LOWER RESP. TRACT INFECTION: ICD-10-CM

## 2023-03-13 PROCEDURE — 99214 OFFICE O/P EST MOD 30 MIN: CPT | Performed by: NURSE PRACTITIONER

## 2023-03-13 RX ORDER — AZITHROMYCIN 250 MG/1
TABLET, FILM COATED ORAL
Qty: 6 TABLET | Refills: 0 | Status: SHIPPED | OUTPATIENT
Start: 2023-03-13 | End: 2023-04-14

## 2023-03-13 RX ORDER — DEXTROMETHORPHAN HBR. AND GUAIFENESIN 10; 100 MG/5ML; MG/5ML
10 SOLUTION ORAL EVERY 4 HOURS PRN
Qty: 420 ML | Refills: 0 | Status: SHIPPED | OUTPATIENT
Start: 2023-03-13 | End: 2023-03-20

## 2023-03-13 RX ORDER — ONDANSETRON 4 MG/1
4 TABLET, ORALLY DISINTEGRATING ORAL EVERY 6 HOURS PRN
Qty: 10 TABLET | Refills: 0 | Status: SHIPPED | OUTPATIENT
Start: 2023-03-13 | End: 2023-04-14

## 2023-03-13 ASSESSMENT — FIBROSIS 4 INDEX: FIB4 SCORE: 2.17

## 2023-03-14 NOTE — PROGRESS NOTES
Subjective:     CC: Cough    HPI:   Susy is an established patient of Dr. Helton who presents today with the following:    Cough   She saw Urgent Care on 3/6/23 for c/o cough and congestion x 2 weeks. She was prescribed 10 day course of Doxycycline. She did not complete the Doxycycline course, stopped the medication 2 days ago as it made her nauseated and she had vomiting. She presents today with continued productive cough with white phlegm. She continues with tessalon. Her  states that she is worse. Subjective fever 2 days ago. Not sleeping due to cough. Short of breath with increased coughing. Denies fever, chills, sore throat, ear pain, sinus pain, sick contacts, loss of taste, loss of smell, itchy eyes, watery eyes, chest pain or shortness of breath at rest. She did not get her influenza vaccine this season.       Past Medical History:   Diagnosis Date    Enlarged thyroid gland 2021    Hyperlipidemia     Prediabetes     Urinary tract infection        Social History     Tobacco Use    Smoking status: Former     Packs/day: 0.25     Years: 4.00     Pack years: 1.00     Types: Cigarettes     Quit date: 1986     Years since quittin.9    Smokeless tobacco: Never    Tobacco comments:     smoked 4 years in college, 3 per day social   Vaping Use    Vaping Use: Never used   Substance Use Topics    Alcohol use: Yes     Comment: socially - wine    Drug use: Not Currently       Current Outpatient Medications Ordered in Epic   Medication Sig Dispense Refill    guaifenesin dextromethorphan sugar free (DIABETIC TUSSIN DM)  MG/5ML Liquid soln Take 10 mL by mouth every four hours as needed for Cough for up to 7 days. 420 mL 0    ondansetron (ZOFRAN ODT) 4 MG TABLET DISPERSIBLE Take 1 Tablet by mouth every 6 hours as needed for Nausea/Vomiting. 10 Tablet 0    azithromycin (ZITHROMAX) 250 MG Tab On Day 1 take two tablets (500 mg) by mouth. On Day 2-5 take one tablet by mouth (250 mg) daily. 6 Tablet 0     "benzonatate (TESSALON) 100 MG Cap Take 1 Capsule by mouth 3 times a day as needed for Cough. 60 Capsule 0     No current Epic-ordered facility-administered medications on file.       Allergies:  Patient has no known allergies.    Health Maintenance: Deferred      Objective:     Vital signs reviewed  Exam:  /82 (BP Location: Right arm, Patient Position: Sitting, BP Cuff Size: Adult)   Pulse 78   Temp 36 °C (96.8 °F) (Temporal)   Ht 1.702 m (5' 7\")   Wt 73.5 kg (162 lb)   SpO2 99%   BMI 25.37 kg/m²  Body mass index is 25.37 kg/m².    General: Normal appearing. No distress.  HENT: Normocephalic. Ears normal shape and contour, canals are clear bilaterally, left TM pearly gray, right TM with injection, nasal mucosa benign, oropharynx is without erythema, edema or exudates.  No pain on palpation to frontal maxillary sinuses.  Eyes: Eyes conjunctiva clear lids without ptosis, pupils equal and reactive to light accommodation, lids normal.  Pulmonary: Diminished right lower lobe.  Normal effort. No rales, ronchi, or wheezing. + Nonproductive cough  Cardiovascular: Regular rate and rhythm without murmur.   Lymph: No cervical or supraclavicular lymph nodes are palpable.  Skin: Warm and dry.  No obvious lesions.  Psych: Normal mood and affect. Alert and oriented x3. Judgment and insight is normal.      Assessment & Plan:     62 y.o. female with the following -     1. Lower resp. tract infection  Chronic exacerbated problem.  Stop doxycycline due to nausea and vomiting.  She is still diminished on exam today and her right lower lobe.  Start azithromycin 500 mg on the first day and days 2 through 5 250 mg daily.  Stay hydrated.  Take medication with food or take a daily probiotic.  Follow-up if symptoms worsen.  - azithromycin (ZITHROMAX) 250 MG Tab; On Day 1 take two tablets (500 mg) by mouth. On Day 2-5 take one tablet by mouth (250 mg) daily.  Dispense: 6 Tablet; Refill: 0    2. Cough in adult  Acute uncomplicated " problem.  Continue with Tessalon Perles as needed.  Start guaifenesin dextromethorphan 10 mL every 4 hours as needed for cough.  - guaifenesin dextromethorphan sugar free (DIABETIC TUSSIN DM)  MG/5ML Liquid soln; Take 10 mL by mouth every four hours as needed for Cough for up to 7 days.  Dispense: 420 mL; Refill: 0    3. Nausea  Acute uncomplicated problem.  Patient is asking for prescription for nausea medicine as she does get nauseous with antibiotics.  May start ondansetron 4 mg every 6 hours as needed for nausea.  - ondansetron (ZOFRAN ODT) 4 MG TABLET DISPERSIBLE; Take 1 Tablet by mouth every 6 hours as needed for Nausea/Vomiting.  Dispense: 10 Tablet; Refill: 0      Return if symptoms worsen or fail to improve.    Please note that this dictation was created using voice recognition software. I have made every reasonable attempt to correct obvious errors, but I expect that there are errors of grammar and possibly content that I did not discover before finalizing the note.

## 2023-04-13 SDOH — ECONOMIC STABILITY: INCOME INSECURITY: IN THE LAST 12 MONTHS, WAS THERE A TIME WHEN YOU WERE NOT ABLE TO PAY THE MORTGAGE OR RENT ON TIME?: NO

## 2023-04-13 SDOH — ECONOMIC STABILITY: FOOD INSECURITY: WITHIN THE PAST 12 MONTHS, YOU WORRIED THAT YOUR FOOD WOULD RUN OUT BEFORE YOU GOT MONEY TO BUY MORE.: NEVER TRUE

## 2023-04-13 SDOH — ECONOMIC STABILITY: FOOD INSECURITY: WITHIN THE PAST 12 MONTHS, THE FOOD YOU BOUGHT JUST DIDN'T LAST AND YOU DIDN'T HAVE MONEY TO GET MORE.: NEVER TRUE

## 2023-04-13 SDOH — HEALTH STABILITY: PHYSICAL HEALTH: ON AVERAGE, HOW MANY MINUTES DO YOU ENGAGE IN EXERCISE AT THIS LEVEL?: 30 MIN

## 2023-04-13 SDOH — HEALTH STABILITY: PHYSICAL HEALTH: ON AVERAGE, HOW MANY DAYS PER WEEK DO YOU ENGAGE IN MODERATE TO STRENUOUS EXERCISE (LIKE A BRISK WALK)?: 3 DAYS

## 2023-04-13 SDOH — ECONOMIC STABILITY: INCOME INSECURITY: HOW HARD IS IT FOR YOU TO PAY FOR THE VERY BASICS LIKE FOOD, HOUSING, MEDICAL CARE, AND HEATING?: NOT HARD AT ALL

## 2023-04-13 SDOH — ECONOMIC STABILITY: HOUSING INSECURITY: IN THE LAST 12 MONTHS, HOW MANY PLACES HAVE YOU LIVED?: 1

## 2023-04-13 ASSESSMENT — SOCIAL DETERMINANTS OF HEALTH (SDOH)
HOW OFTEN DO YOU ATTENT MEETINGS OF THE CLUB OR ORGANIZATION YOU BELONG TO?: NEVER
HOW MANY DRINKS CONTAINING ALCOHOL DO YOU HAVE ON A TYPICAL DAY WHEN YOU ARE DRINKING: 1 OR 2
DO YOU BELONG TO ANY CLUBS OR ORGANIZATIONS SUCH AS CHURCH GROUPS UNIONS, FRATERNAL OR ATHLETIC GROUPS, OR SCHOOL GROUPS?: NO
IN A TYPICAL WEEK, HOW MANY TIMES DO YOU TALK ON THE PHONE WITH FAMILY, FRIENDS, OR NEIGHBORS?: MORE THAN THREE TIMES A WEEK
HOW OFTEN DO YOU GET TOGETHER WITH FRIENDS OR RELATIVES?: TWICE A WEEK
DO YOU BELONG TO ANY CLUBS OR ORGANIZATIONS SUCH AS CHURCH GROUPS UNIONS, FRATERNAL OR ATHLETIC GROUPS, OR SCHOOL GROUPS?: NO
HOW OFTEN DO YOU ATTEND CHURCH OR RELIGIOUS SERVICES?: NEVER
IN A TYPICAL WEEK, HOW MANY TIMES DO YOU TALK ON THE PHONE WITH FAMILY, FRIENDS, OR NEIGHBORS?: MORE THAN THREE TIMES A WEEK
HOW HARD IS IT FOR YOU TO PAY FOR THE VERY BASICS LIKE FOOD, HOUSING, MEDICAL CARE, AND HEATING?: NOT HARD AT ALL
WITHIN THE PAST 12 MONTHS, YOU WORRIED THAT YOUR FOOD WOULD RUN OUT BEFORE YOU GOT THE MONEY TO BUY MORE: NEVER TRUE
HOW OFTEN DO YOU GET TOGETHER WITH FRIENDS OR RELATIVES?: TWICE A WEEK
HOW OFTEN DO YOU ATTEND CHURCH OR RELIGIOUS SERVICES?: NEVER
HOW OFTEN DO YOU HAVE SIX OR MORE DRINKS ON ONE OCCASION: NEVER
HOW OFTEN DO YOU HAVE A DRINK CONTAINING ALCOHOL: 2-4 TIMES A MONTH
HOW OFTEN DO YOU ATTENT MEETINGS OF THE CLUB OR ORGANIZATION YOU BELONG TO?: NEVER

## 2023-04-13 ASSESSMENT — LIFESTYLE VARIABLES
AUDIT-C TOTAL SCORE: 2
HOW OFTEN DO YOU HAVE A DRINK CONTAINING ALCOHOL: 2-4 TIMES A MONTH
SKIP TO QUESTIONS 9-10: 1
HOW OFTEN DO YOU HAVE SIX OR MORE DRINKS ON ONE OCCASION: NEVER
HOW MANY STANDARD DRINKS CONTAINING ALCOHOL DO YOU HAVE ON A TYPICAL DAY: 1 OR 2

## 2023-04-14 ENCOUNTER — OFFICE VISIT (OUTPATIENT)
Dept: MEDICAL GROUP | Facility: LAB | Age: 63
End: 2023-04-14
Payer: COMMERCIAL

## 2023-04-14 VITALS
OXYGEN SATURATION: 97 % | HEIGHT: 67 IN | TEMPERATURE: 97 F | SYSTOLIC BLOOD PRESSURE: 124 MMHG | HEART RATE: 72 BPM | DIASTOLIC BLOOD PRESSURE: 60 MMHG | BODY MASS INDEX: 25.69 KG/M2 | WEIGHT: 163.7 LBS

## 2023-04-14 DIAGNOSIS — Z00.00 HEALTHCARE MAINTENANCE: ICD-10-CM

## 2023-04-14 DIAGNOSIS — R92.8 ABNORMAL MAMMOGRAM: ICD-10-CM

## 2023-04-14 DIAGNOSIS — R09.82 POST-NASAL DRIP: ICD-10-CM

## 2023-04-14 DIAGNOSIS — E04.1 RIGHT THYROID NODULE: Chronic | ICD-10-CM

## 2023-04-14 PROBLEM — M79.604 RIGHT LEG PAIN: Status: RESOLVED | Noted: 2021-08-18 | Resolved: 2023-04-14

## 2023-04-14 PROBLEM — H92.02 EAR DISCOMFORT, LEFT: Status: RESOLVED | Noted: 2021-11-22 | Resolved: 2023-04-14

## 2023-04-14 PROBLEM — R05.9 COUGH: Status: RESOLVED | Noted: 2021-12-21 | Resolved: 2023-04-14

## 2023-04-14 PROBLEM — H66.90 OTITIS MEDIA: Status: RESOLVED | Noted: 2021-11-22 | Resolved: 2023-04-14

## 2023-04-14 PROCEDURE — 99214 OFFICE O/P EST MOD 30 MIN: CPT | Performed by: STUDENT IN AN ORGANIZED HEALTH CARE EDUCATION/TRAINING PROGRAM

## 2023-04-14 RX ORDER — METHYLPREDNISOLONE 4 MG/1
TABLET ORAL
Qty: 21 TABLET | Refills: 0 | Status: SHIPPED | OUTPATIENT
Start: 2023-04-14 | End: 2023-07-14

## 2023-04-14 RX ORDER — ALBUTEROL SULFATE 90 UG/1
2 AEROSOL, METERED RESPIRATORY (INHALATION) EVERY 4 HOURS PRN
Qty: 1 EACH | Refills: 0 | Status: SHIPPED | OUTPATIENT
Start: 2023-04-14

## 2023-04-14 NOTE — PROGRESS NOTES
"Subjective:     CC: Transferring to new provider     HPI:   Susy presents today for the following;    Problem   Post-Nasal Drip    Cough at night, clears her throat constantly. Dry cough for roughly 1.5 months. Has been on multiple rounds of antibiotics      Right Thyroid Nodule    1.3cm thyroid nodule found on imaging that was biopsied in 2022 and reported as benign      Cough (Resolved)   Ear Discomfort, Left (Resolved)   Otitis Media (Resolved)   Right Leg Pain (Resolved)       Current Outpatient Medications Ordered in Epic   Medication Sig Dispense Refill    methylPREDNISolone (MEDROL DOSEPAK) 4 MG Tablet Therapy Pack As directed on the packaging label. 21 Tablet 0    albuterol 108 (90 Base) MCG/ACT Aero Soln inhalation aerosol Inhale 2 Puffs every four hours as needed (cough). 1 Each 0     No current [facility]-ordered facility-administered medications on file.           ROS:  ROS    Objective:     Exam:  /60   Pulse 72   Temp 36.1 °C (97 °F) (Esophageal)   Ht 1.702 m (5' 7\")   Wt 74.3 kg (163 lb 11.2 oz)   SpO2 97%   BMI 25.64 kg/m²  Body mass index is 25.64 kg/m².    Physical Exam          Assessment & Plan:     Problem List Items Addressed This Visit       Right thyroid nodule (Chronic)    Relevant Orders    US-THYROID    Abnormal mammogram    Relevant Orders    MA DIAGNOSTIC MAMMO BILAT W/CAD    US-BREAST BILAT-COMPLETE    Post-nasal drip     Acute   -will provide medrol dose pack   -albuterol for cough         Relevant Medications    methylPREDNISolone (MEDROL DOSEPAK) 4 MG Tablet Therapy Pack    albuterol 108 (90 Base) MCG/ACT Aero Soln inhalation aerosol     Other Visit Diagnoses       Healthcare maintenance        Relevant Orders    VITAMIN D,25 HYDROXY (DEFICIENCY)    HEMOGLOBIN A1C    CBC WITH DIFFERENTIAL    Comp Metabolic Panel    TSH WITH REFLEX TO FT4    Lipid Profile                  Return in about 3 months (around 7/14/2023) for Annual .    Please note that this dictation was created " using voice recognition software. I have made every reasonable attempt to correct obvious errors, but I expect that there are errors of grammar and possibly content that I did not discover before finalizing the note.

## 2023-05-18 ENCOUNTER — HOSPITAL ENCOUNTER (OUTPATIENT)
Dept: RADIOLOGY | Facility: MEDICAL CENTER | Age: 63
End: 2023-05-18
Attending: STUDENT IN AN ORGANIZED HEALTH CARE EDUCATION/TRAINING PROGRAM
Payer: COMMERCIAL

## 2023-05-18 DIAGNOSIS — E04.1 RIGHT THYROID NODULE: ICD-10-CM

## 2023-05-18 PROCEDURE — 76536 US EXAM OF HEAD AND NECK: CPT

## 2023-05-19 DIAGNOSIS — E04.1 RIGHT THYROID NODULE: ICD-10-CM

## 2023-05-26 ENCOUNTER — HOSPITAL ENCOUNTER (OUTPATIENT)
Dept: RADIOLOGY | Facility: MEDICAL CENTER | Age: 63
End: 2023-05-26
Attending: STUDENT IN AN ORGANIZED HEALTH CARE EDUCATION/TRAINING PROGRAM
Payer: COMMERCIAL

## 2023-05-26 DIAGNOSIS — E04.1 RIGHT THYROID NODULE: ICD-10-CM

## 2023-05-26 LAB — CYTOLOGY REG CYTOL: NORMAL

## 2023-05-26 PROCEDURE — 88173 CYTOPATH EVAL FNA REPORT: CPT

## 2023-05-26 PROCEDURE — 700111 HCHG RX REV CODE 636 W/ 250 OVERRIDE (IP)

## 2023-05-26 PROCEDURE — 10005 FNA BX W/US GDN 1ST LES: CPT

## 2023-05-26 RX ORDER — LIDOCAINE HYDROCHLORIDE 10 MG/ML
INJECTION, SOLUTION EPIDURAL; INFILTRATION; INTRACAUDAL; PERINEURAL
Status: COMPLETED
Start: 2023-05-26 | End: 2023-05-26

## 2023-05-26 RX ADMIN — LIDOCAINE HYDROCHLORIDE: 10 INJECTION, SOLUTION EPIDURAL; INFILTRATION; INTRACAUDAL at 10:59

## 2023-05-26 NOTE — PROGRESS NOTES
US guided R middle lobe thyroid nodule fine needle aspiration done by Dr. Verma; NON-SEDATION (no H&P required as this is a NON SEDATION procedure) R anterior aspect of neck access site, dressing CDI; 1 jar of cytolyt obtained, 1 vial afirma obtained and hand delivered to pathology lab. Pt tolerated the procedure well. Pt hemodynamically stable pre/intra/post procedure; all questions and concerns answered prior to being d/c; patient provided with appropriate education for procedure; pt d/c home.

## 2023-05-31 DIAGNOSIS — E06.9 THYROIDITIS: ICD-10-CM

## 2023-07-14 ENCOUNTER — OFFICE VISIT (OUTPATIENT)
Dept: MEDICAL GROUP | Facility: LAB | Age: 63
End: 2023-07-14
Payer: COMMERCIAL

## 2023-07-14 VITALS
TEMPERATURE: 97 F | BODY MASS INDEX: 25.78 KG/M2 | HEIGHT: 67 IN | DIASTOLIC BLOOD PRESSURE: 62 MMHG | SYSTOLIC BLOOD PRESSURE: 126 MMHG | OXYGEN SATURATION: 98 % | HEART RATE: 66 BPM | WEIGHT: 164.24 LBS | RESPIRATION RATE: 14 BRPM

## 2023-07-14 DIAGNOSIS — Z00.00 ANNUAL PHYSICAL EXAM: ICD-10-CM

## 2023-07-14 DIAGNOSIS — Z13.820 OSTEOPOROSIS SCREENING: ICD-10-CM

## 2023-07-14 DIAGNOSIS — R01.1 SYSTOLIC MURMUR: ICD-10-CM

## 2023-07-14 PROCEDURE — 3074F SYST BP LT 130 MM HG: CPT | Performed by: STUDENT IN AN ORGANIZED HEALTH CARE EDUCATION/TRAINING PROGRAM

## 2023-07-14 PROCEDURE — 99396 PREV VISIT EST AGE 40-64: CPT | Performed by: STUDENT IN AN ORGANIZED HEALTH CARE EDUCATION/TRAINING PROGRAM

## 2023-07-14 PROCEDURE — 3078F DIAST BP <80 MM HG: CPT | Performed by: STUDENT IN AN ORGANIZED HEALTH CARE EDUCATION/TRAINING PROGRAM

## 2023-07-14 ASSESSMENT — PATIENT HEALTH QUESTIONNAIRE - PHQ9: CLINICAL INTERPRETATION OF PHQ2 SCORE: 0

## 2023-07-14 ASSESSMENT — ENCOUNTER SYMPTOMS
SHORTNESS OF BREATH: 0
CHILLS: 0
FEVER: 0

## 2023-07-14 NOTE — PROGRESS NOTES
Subjective:     CC:   Chief Complaint   Patient presents with    Annual Exam       HPI:   Susy Wheeler is a 62 y.o. female who presents for annual exam    Patient has GYN provider: No   Last Pap Smear: unable to recall   H/O Abnormal Pap: yes, but had total hysterectomy including cervix   Last Mammogram:   Last Bone Density Test: 10 years ago  Last Colorectal Cancer Screenin  Last Tdap: 2019  Received HPV series: Aged out    Exercise: no regular exercise   Diet: trying to eat low carb diet     No LMP recorded. Patient has had a hysterectomy.  She has not utilized hormone replacement therapy.  Denies any menopausal symptoms.  No significant bloating/fluid retention, pelvic pain, or dyspareunia. No abnormal vaginal discharge.   No breast tenderness, mass, nipple discharge or changes in size or contour.    OB History    Para Term  AB Living   0 0 0 0 0 0   SAB IAB Ectopic Molar Multiple Live Births   0 0 0 0 0 0      She  has no history on file for sexual activity.    She  has a past medical history of Enlarged thyroid gland (2021), Hyperlipidemia, Prediabetes, and Urinary tract infection.    She has no past medical history of Asthma, Hypertension, or Kidney disease.  She  has a past surgical history that includes gyn surgery ().    Family History   Problem Relation Age of Onset    COPD Mother     Hypertension Father     Stroke Father     Hypertension Sister     Hypertension Brother     Arthritis Brother     COPD Brother     Cancer Paternal Aunt         cervix cancer    Cancer Maternal Grandmother         chin and mouth    Heart Disease Maternal Grandfather     Hypertension Brother     Anxiety disorder Brother     Hypertension Sister     Cancer Sister         Pancreatic cancer    Cancer Paternal Aunt         breast     Social History     Tobacco Use    Smoking status: Former     Packs/day: 0.25     Years: 4.00     Pack years: 1.00     Types: Cigarettes     Quit date: 1986     Years  "since quittin.2    Smokeless tobacco: Never    Tobacco comments:     smoked 4 years in college, 3 per day social   Vaping Use    Vaping Use: Never used   Substance Use Topics    Alcohol use: Yes     Comment: socially - wine    Drug use: Not Currently       Patient Active Problem List    Diagnosis Date Noted    Annual physical exam 2023    Systolic murmur 2023    Post-nasal drip 2023    Colon polyps 2022    Abnormal mammogram 2021    Family history of pancreatic cancer 2021    Vitamin D deficiency 2021    Right thyroid nodule 2021    Mixed hyperlipidemia 2019    Prediabetes 2019     Current Outpatient Medications   Medication Sig Dispense Refill    albuterol 108 (90 Base) MCG/ACT Aero Soln inhalation aerosol Inhale 2 Puffs every four hours as needed (cough). 1 Each 0     No current facility-administered medications for this visit.     No Known Allergies    Review of Systems   Review of Systems   Constitutional:  Negative for chills and fever.   Respiratory:  Negative for shortness of breath.    Cardiovascular:  Negative for chest pain.         Objective:   /62 (BP Location: Right arm, Patient Position: Sitting, BP Cuff Size: Adult long)   Pulse 66   Temp 36.1 °C (97 °F)   Resp 14   Ht 1.702 m (5' 7\")   Wt 74.5 kg (164 lb 3.9 oz)   SpO2 98%   BMI 25.72 kg/m²     Wt Readings from Last 4 Encounters:   23 74.5 kg (164 lb 3.9 oz)   23 74.3 kg (163 lb 11.2 oz)   23 73.5 kg (162 lb)   23 74.8 kg (165 lb)           Physical Exam:  Physical Exam  Constitutional:       General: She is not in acute distress.     Appearance: Normal appearance. She is not ill-appearing.   HENT:      Head: Normocephalic and atraumatic.      Right Ear: Tympanic membrane and ear canal normal.      Left Ear: Tympanic membrane and ear canal normal.      Mouth/Throat:      Mouth: Mucous membranes are moist.      Pharynx: Oropharynx is clear.   Eyes:    "   Extraocular Movements: Extraocular movements intact.      Pupils: Pupils are equal, round, and reactive to light.   Neck:      Thyroid: No thyromegaly.   Cardiovascular:      Rate and Rhythm: Normal rate and regular rhythm.      Heart sounds: Normal heart sounds.   Pulmonary:      Effort: Pulmonary effort is normal.      Breath sounds: Normal breath sounds.   Abdominal:      General: Abdomen is flat. Bowel sounds are normal.      Palpations: Abdomen is soft. There is no mass.      Tenderness: There is no abdominal tenderness. There is no guarding.   Musculoskeletal:      Cervical back: Normal range of motion and neck supple.      Right lower leg: No edema.      Left lower leg: No edema.   Lymphadenopathy:      Cervical: No cervical adenopathy.   Skin:     General: Skin is warm and dry.   Neurological:      General: No focal deficit present.      Mental Status: She is alert.   Psychiatric:         Mood and Affect: Mood normal.         Behavior: Behavior normal.         Thought Content: Thought content normal.         Judgment: Judgment normal.           Assessment and Plan:     1. Osteoporosis screening  - DS-BONE DENSITY STUDY (DEXA); Future    2. Annual physical exam    3. Systolic murmur      Health maintenance:    Labs per orders  Immunizations per orders  Patient counseled about skin care, diet, supplements, and exercise.  Discussed  diet and exercise     Follow-up: 1 year annual

## 2023-11-10 ENCOUNTER — HOSPITAL ENCOUNTER (OUTPATIENT)
Dept: RADIOLOGY | Facility: MEDICAL CENTER | Age: 63
End: 2023-11-10
Attending: STUDENT IN AN ORGANIZED HEALTH CARE EDUCATION/TRAINING PROGRAM
Payer: COMMERCIAL

## 2023-11-10 DIAGNOSIS — Z13.820 OSTEOPOROSIS SCREENING: ICD-10-CM

## 2023-11-10 DIAGNOSIS — Z12.31 ENCOUNTER FOR MAMMOGRAM TO ESTABLISH BASELINE MAMMOGRAM: ICD-10-CM

## 2023-11-10 PROCEDURE — 77063 BREAST TOMOSYNTHESIS BI: CPT

## 2023-11-10 PROCEDURE — 77080 DXA BONE DENSITY AXIAL: CPT

## 2024-04-06 ENCOUNTER — HOSPITAL ENCOUNTER (OUTPATIENT)
Dept: LAB | Facility: MEDICAL CENTER | Age: 64
End: 2024-04-06
Attending: STUDENT IN AN ORGANIZED HEALTH CARE EDUCATION/TRAINING PROGRAM
Payer: COMMERCIAL

## 2024-04-06 DIAGNOSIS — Z00.00 HEALTHCARE MAINTENANCE: ICD-10-CM

## 2024-04-06 LAB
25(OH)D3 SERPL-MCNC: 18 NG/ML (ref 30–100)
ALBUMIN SERPL BCP-MCNC: 4.2 G/DL (ref 3.2–4.9)
ALBUMIN/GLOB SERPL: 1.4 G/DL
ALP SERPL-CCNC: 91 U/L (ref 30–99)
ALT SERPL-CCNC: 17 U/L (ref 2–50)
ANION GAP SERPL CALC-SCNC: 12 MMOL/L (ref 7–16)
AST SERPL-CCNC: 19 U/L (ref 12–45)
BASOPHILS # BLD AUTO: 1.6 % (ref 0–1.8)
BASOPHILS # BLD: 0.1 K/UL (ref 0–0.12)
BILIRUB SERPL-MCNC: 0.8 MG/DL (ref 0.1–1.5)
BUN SERPL-MCNC: 16 MG/DL (ref 8–22)
CALCIUM ALBUM COR SERPL-MCNC: 9.2 MG/DL (ref 8.5–10.5)
CALCIUM SERPL-MCNC: 9.4 MG/DL (ref 8.5–10.5)
CHLORIDE SERPL-SCNC: 105 MMOL/L (ref 96–112)
CHOLEST SERPL-MCNC: 209 MG/DL (ref 100–199)
CO2 SERPL-SCNC: 23 MMOL/L (ref 20–33)
CREAT SERPL-MCNC: 0.77 MG/DL (ref 0.5–1.4)
EOSINOPHIL # BLD AUTO: 0.17 K/UL (ref 0–0.51)
EOSINOPHIL NFR BLD: 2.6 % (ref 0–6.9)
ERYTHROCYTE [DISTWIDTH] IN BLOOD BY AUTOMATED COUNT: 45.6 FL (ref 35.9–50)
EST. AVERAGE GLUCOSE BLD GHB EST-MCNC: 114 MG/DL
GFR SERPLBLD CREATININE-BSD FMLA CKD-EPI: 86 ML/MIN/1.73 M 2
GLOBULIN SER CALC-MCNC: 3.1 G/DL (ref 1.9–3.5)
GLUCOSE SERPL-MCNC: 89 MG/DL (ref 65–99)
HBA1C MFR BLD: 5.6 % (ref 4–5.6)
HCT VFR BLD AUTO: 45.6 % (ref 37–47)
HDLC SERPL-MCNC: 68 MG/DL
HGB BLD-MCNC: 14.4 G/DL (ref 12–16)
IMM GRANULOCYTES # BLD AUTO: 0.01 K/UL (ref 0–0.11)
IMM GRANULOCYTES NFR BLD AUTO: 0.2 % (ref 0–0.9)
LDLC SERPL CALC-MCNC: 122 MG/DL
LYMPHOCYTES # BLD AUTO: 1.91 K/UL (ref 1–4.8)
LYMPHOCYTES NFR BLD: 29.6 % (ref 22–41)
MCH RBC QN AUTO: 29.6 PG (ref 27–33)
MCHC RBC AUTO-ENTMCNC: 31.6 G/DL (ref 32.2–35.5)
MCV RBC AUTO: 93.6 FL (ref 81.4–97.8)
MONOCYTES # BLD AUTO: 0.53 K/UL (ref 0–0.85)
MONOCYTES NFR BLD AUTO: 8.2 % (ref 0–13.4)
NEUTROPHILS # BLD AUTO: 3.73 K/UL (ref 1.82–7.42)
NEUTROPHILS NFR BLD: 57.8 % (ref 44–72)
NRBC # BLD AUTO: 0 K/UL
NRBC BLD-RTO: 0 /100 WBC (ref 0–0.2)
PLATELET # BLD AUTO: 196 K/UL (ref 164–446)
PMV BLD AUTO: 11.7 FL (ref 9–12.9)
POTASSIUM SERPL-SCNC: 4.6 MMOL/L (ref 3.6–5.5)
PROT SERPL-MCNC: 7.3 G/DL (ref 6–8.2)
RBC # BLD AUTO: 4.87 M/UL (ref 4.2–5.4)
SODIUM SERPL-SCNC: 140 MMOL/L (ref 135–145)
TRIGL SERPL-MCNC: 95 MG/DL (ref 0–149)
TSH SERPL DL<=0.005 MIU/L-ACNC: 1.03 UIU/ML (ref 0.38–5.33)
WBC # BLD AUTO: 6.5 K/UL (ref 4.8–10.8)

## 2024-04-06 PROCEDURE — 80061 LIPID PANEL: CPT

## 2024-04-06 PROCEDURE — 80053 COMPREHEN METABOLIC PANEL: CPT

## 2024-04-06 PROCEDURE — 83036 HEMOGLOBIN GLYCOSYLATED A1C: CPT

## 2024-04-06 PROCEDURE — 84443 ASSAY THYROID STIM HORMONE: CPT

## 2024-04-06 PROCEDURE — 85025 COMPLETE CBC W/AUTO DIFF WBC: CPT

## 2024-04-06 PROCEDURE — 82306 VITAMIN D 25 HYDROXY: CPT

## 2024-04-06 PROCEDURE — 36415 COLL VENOUS BLD VENIPUNCTURE: CPT

## 2024-04-15 ENCOUNTER — TELEPHONE (OUTPATIENT)
Dept: MEDICAL GROUP | Facility: LAB | Age: 64
End: 2024-04-15
Payer: COMMERCIAL

## 2024-04-15 NOTE — LETTER
April 15, 2024        Susy Wheeler  [address] [address]      Dear Susy:    See attached labs and message from Dr Hunt. Please schedule a follow up in July also.  ontains abnormal data Lipid Profile  Order: [id number]   Status: Final result       Visible to patient: Yes (not seen)       Next appt: None       Dx: Healthcare maintenance    5 Result Notes       1 Follow-up Encounter        Component  Ref Range & Units 9 d ago 2 yr ago 3 yr ago   Cholesterol,Tot  100 - 199 mg/dL 209 High  196 240 High    Triglycerides  0 - 149 mg/dL 95 127 147   HDL  >=40 mg/dL 68 68 63   LDL  <100 mg/dL 122 High  103 High  148 High    Resulting Agency [facility]              Specimen Collected: 04/06/24  8:18 AM Last Resulted: 04/06/24  2:38 PM      TSH  0.380 - 5.330 uIU/mL 1.030 0.860 CM 1.310 CM   Comp Metabolic Panel  Order: [id number]   Status: Final result       Visible to patient: Yes (not seen)       Next appt: None       Dx: Healthcare maintenance    5 Result Notes       1 Follow-up Encounter            Component  Ref Range & Units 9 d ago  (4/6/24) 2 yr ago  (12/16/21) 2 yr ago  (12/16/21) 3 yr ago  (4/6/21) 5 yr ago  (7/18/18)   Sodium  135 - 145 mmol/L 140  142 137 141 R   Potassium  3.6 - 5.5 mmol/L 4.6  4.4 4.5 3.8 R   Chloride  96 - 112 mmol/L 105  107 103 106 R   Co2  20 - 33 mmol/L 23  26 27 29 R   Anion Gap  7.0 - 16.0 12.0  9.0 7.0    Glucose  65 - 99 mg/dL 89  94 94 102 High  R, CM   Bun  8 - 22 mg/dL 16  15 15 13 R   Creatinine  0.50 - 1.40 mg/dL 0.77  0.77 0.77 0.85 R, CM   Calcium  8.5 - 10.5 mg/dL 9.4  9.3 9.4 9.1 R   Correct Calcium  8.5 - 10.5 mg/dL 9.2       AST(SGOT)  12 - 45 U/L 19   25    ALT(SGPT)  2 - 50 U/L 17 20  25    Alkaline Phosphatase  30 - 99 U/L 91   94    Total Bilirubin  0.1 - 1.5 mg/dL 0.8   0.6    Albumin  3.2 - 4.9 g/dL 4.2   4.1    Total Protein  6.0 - 8.2 g/dL 7.3   7.3    Globulin  1.9 - 3.5 g/dL 3.1   3.2    A-G Ratio  g/dL 1.4   1.3       tains abnormal data CBC WITH  DIFFERENTIAL  Order: [id number]   Status: Final result       Visible to patient: Yes (not seen)       Next appt: None       Dx: Healthcare maintenance    5 Result Notes       1 Follow-up Encounter        Component  Ref Range & Units 9 d ago 3 yr ago 5 yr ago   WBC  4.8 - 10.8 K/uL 6.5 7.9 9.4 R   RBC  4.20 - 5.40 M/uL 4.87 4.84    Hemoglobin  12.0 - 16.0 g/dL 14.4 14.5 12.8 R   Hematocrit  37.0 - 47.0 % 45.6 46.0 38.9 R   MCV  81.4 - 97.8 fL 93.6 95.0 92 R   MCH  27.0 - 33.0 pg 29.6 30.0 30.1   MCHC  32.2 - 35.5 g/dL 31.6 Low  31.5 Low  R 32.9 R   Comment: Please note new reference range effective 05/22/2023.   RDW  35.9 - 50.0 fL 45.6 47.5 13.2 R   Platelet Count  164 - 446 K/uL 196 160 Low  305 R   MPV  9.0 - 12.9 fL 11.7 13.0 High     Neutrophils-Polys  44.00 - 72.00 % 57.80 54.10 50 R   Lymphocytes  22.00 - 41.00 % 29.60 34.70 37 R   Monocytes  0.00 - 13.40 % 8.20 7.10 8 R   Eosinophils  0.00 - 6.90 % 2.60 2.30 4 R   Basophils  0.00 - 1.80 % 1.60 1.40 1 R   Immature Granulocytes  0.00 - 0.90 % 0.20 0.40    Nucleated RBC  0.00 - 0.20 /100 WBC 0.00 0.00 R    Comment: Please note new reference range effective 05/22/2023.   Neutrophils (Absolute)  1.82 - 7.42 K/uL 3.73 4.25 R, CM 4.6 R   Comment: Includes immature neutrophils, if present.  Please note new reference range effective 05/22/2023.   Lymphs (Absolute)  1.00 - 4.80 K/uL 1.91 2.73    Monos (Absolute)  0.00 - 0.85 K/uL 0.53 0.56 0.8 R   Eos (Absolute)  0.00 - 0.51 K/uL 0.17 0.18 0.4 R   Baso (Absolute)  0.00 - 0.12 K/uL 0.10 0.11 0.1 R   Immature Granulocytes (abs)  0.00 - 0.11 K/uL 0.01 0.03    NRBC (Absolute)  K/uL 0.00 0.00    Erythrocytes, Absolute   4.25 R   Methodology   Automated   Lymphocyte Absolute #   3.5 R        Glycohemoglobin  4.0 - 5.6 % 5.6 5.5 CM 5.8 High  CM 5.9 High  R          Component  Ref Range & Units 9 d ago 3 yr ago   25-Hydroxy   Vitamin D 25  30 - 100 ng/mL 18 Low  11 Low  R, CM                         Result Care  Coordination      Result Notes     Janeen Rodriguez R.N.  4/12/2024  1:22 PM PDT Back to Top      Left detailed VM. There is no f/u appt on file    Liliya Tran R.N.  4/10/2024 10:20 AM PDT       Attempt x2, LV    Shanelle Guerin R.N.  4/9/2024  9:53 AM PDT       4/9 - LM for patient to call back & discuss    Mikki Man, Kt Ass't  4/8/2024  4:22 PM PDT       Routed to KATHARINE Hunt D.O.  4/8/2024  9:26 AM PDT       Please let patient know that her labs were fine for the most part and we will discuss them further in July.  However she should start to take vitamin D3 2000 IUs/day since her vitamin D levels are still low.             If you have any questions or concerns, please don't hesitate to call.        Sincerely,        Holland Hunt D.O.    Electronically Signed

## 2024-04-16 ENCOUNTER — TELEPHONE (OUTPATIENT)
Dept: HEALTH INFORMATION MANAGEMENT | Facility: OTHER | Age: 64
End: 2024-04-16
Payer: COMMERCIAL

## 2024-04-16 NOTE — TELEPHONE ENCOUNTER
Patient returned call from 4/8 lab results & provider message. Discussed adding Vitamin D3 supplement, patient agreed.    Liliya Tran R.N.

## 2024-04-16 NOTE — TELEPHONE ENCOUNTER
"Letter mailed    ----- Message from Janeen Rodriguez R.N. sent at 4/12/2024  1:25 PM PDT -----  Regarding: send letter?  Hi there. I don't know the \"proper\" way to create a letter for something like this. We have been unable to reach her after 3 attempts. Does a letter need to be sent? If so can someone at this office send one out? Also, I do not see a follow up appointment for July--just FYI.     Thanks!  Janeen Rodriguez R.N.    ----- Message -----  From: Kt Villanueva Ass't  Sent: 4/8/2024   4:22 PM PDT  To: [facility]      ----- Message -----  From: Holland Hunt D.O.  Sent: 4/8/2024   9:26 AM PDT  To: Pardeep Paz Ma    Please let patient know that her labs were fine for the most part and we will discuss them further in July.  However she should start to take vitamin D3 2000 IUs/day since her vitamin D levels are still low.      "

## 2024-06-16 ENCOUNTER — HOSPITAL ENCOUNTER (EMERGENCY)
Facility: MEDICAL CENTER | Age: 64
End: 2024-06-16
Attending: EMERGENCY MEDICINE
Payer: COMMERCIAL

## 2024-06-16 ENCOUNTER — APPOINTMENT (OUTPATIENT)
Dept: RADIOLOGY | Facility: MEDICAL CENTER | Age: 64
End: 2024-06-16
Attending: EMERGENCY MEDICINE
Payer: COMMERCIAL

## 2024-06-16 VITALS
HEART RATE: 100 BPM | WEIGHT: 165.3 LBS | DIASTOLIC BLOOD PRESSURE: 67 MMHG | HEIGHT: 67 IN | RESPIRATION RATE: 23 BRPM | SYSTOLIC BLOOD PRESSURE: 133 MMHG | OXYGEN SATURATION: 95 % | BODY MASS INDEX: 25.94 KG/M2 | TEMPERATURE: 99.4 F

## 2024-06-16 DIAGNOSIS — J22 ACUTE LOWER RESPIRATORY INFECTION: ICD-10-CM

## 2024-06-16 DIAGNOSIS — J98.01 BRONCHOSPASM, ACUTE: ICD-10-CM

## 2024-06-16 LAB
ALBUMIN SERPL BCP-MCNC: 4.2 G/DL (ref 3.2–4.9)
ALBUMIN/GLOB SERPL: 1.3 G/DL
ALP SERPL-CCNC: 101 U/L (ref 30–99)
ALT SERPL-CCNC: 19 U/L (ref 2–50)
ANION GAP SERPL CALC-SCNC: 11 MMOL/L (ref 7–16)
AST SERPL-CCNC: 16 U/L (ref 12–45)
BASOPHILS # BLD AUTO: 0.7 % (ref 0–1.8)
BASOPHILS # BLD: 0.08 K/UL (ref 0–0.12)
BILIRUB SERPL-MCNC: 0.6 MG/DL (ref 0.1–1.5)
BUN SERPL-MCNC: 13 MG/DL (ref 8–22)
CALCIUM ALBUM COR SERPL-MCNC: 8.7 MG/DL (ref 8.5–10.5)
CALCIUM SERPL-MCNC: 8.9 MG/DL (ref 8.4–10.2)
CHLORIDE SERPL-SCNC: 105 MMOL/L (ref 96–112)
CO2 SERPL-SCNC: 23 MMOL/L (ref 20–33)
CREAT SERPL-MCNC: 0.81 MG/DL (ref 0.5–1.4)
EKG IMPRESSION: NORMAL
EOSINOPHIL # BLD AUTO: 0.09 K/UL (ref 0–0.51)
EOSINOPHIL NFR BLD: 0.8 % (ref 0–6.9)
ERYTHROCYTE [DISTWIDTH] IN BLOOD BY AUTOMATED COUNT: 44.1 FL (ref 35.9–50)
FLUAV RNA SPEC QL NAA+PROBE: NEGATIVE
FLUBV RNA SPEC QL NAA+PROBE: NEGATIVE
GFR SERPLBLD CREATININE-BSD FMLA CKD-EPI: 81 ML/MIN/1.73 M 2
GLOBULIN SER CALC-MCNC: 3.2 G/DL (ref 1.9–3.5)
GLUCOSE SERPL-MCNC: 111 MG/DL (ref 65–99)
HCT VFR BLD AUTO: 43.6 % (ref 37–47)
HGB BLD-MCNC: 14.6 G/DL (ref 12–16)
IMM GRANULOCYTES # BLD AUTO: 0.04 K/UL (ref 0–0.11)
IMM GRANULOCYTES NFR BLD AUTO: 0.4 % (ref 0–0.9)
LACTATE SERPL-SCNC: 1.3 MMOL/L (ref 0.5–2)
LYMPHOCYTES # BLD AUTO: 1.51 K/UL (ref 1–4.8)
LYMPHOCYTES NFR BLD: 14 % (ref 22–41)
MCH RBC QN AUTO: 30.8 PG (ref 27–33)
MCHC RBC AUTO-ENTMCNC: 33.5 G/DL (ref 32.2–35.5)
MCV RBC AUTO: 92 FL (ref 81.4–97.8)
MONOCYTES # BLD AUTO: 0.93 K/UL (ref 0–0.85)
MONOCYTES NFR BLD AUTO: 8.6 % (ref 0–13.4)
NEUTROPHILS # BLD AUTO: 8.16 K/UL (ref 1.82–7.42)
NEUTROPHILS NFR BLD: 75.5 % (ref 44–72)
NRBC # BLD AUTO: 0 K/UL
NRBC BLD-RTO: 0 /100 WBC (ref 0–0.2)
NT-PROBNP SERPL IA-MCNC: 112 PG/ML (ref 0–125)
PLATELET # BLD AUTO: 287 K/UL (ref 164–446)
PMV BLD AUTO: 10.5 FL (ref 9–12.9)
POTASSIUM SERPL-SCNC: 3.7 MMOL/L (ref 3.6–5.5)
PROT SERPL-MCNC: 7.4 G/DL (ref 6–8.2)
RBC # BLD AUTO: 4.74 M/UL (ref 4.2–5.4)
RSV RNA SPEC QL NAA+PROBE: NEGATIVE
S PYO DNA SPEC NAA+PROBE: NOT DETECTED
SARS-COV-2 RNA RESP QL NAA+PROBE: NOTDETECTED
SODIUM SERPL-SCNC: 139 MMOL/L (ref 135–145)
SPECIMEN SOURCE: NORMAL
TROPONIN T SERPL-MCNC: 7 NG/L (ref 6–19)
WBC # BLD AUTO: 10.8 K/UL (ref 4.8–10.8)

## 2024-06-16 PROCEDURE — 700101 HCHG RX REV CODE 250: Performed by: EMERGENCY MEDICINE

## 2024-06-16 PROCEDURE — 83605 ASSAY OF LACTIC ACID: CPT

## 2024-06-16 PROCEDURE — 93005 ELECTROCARDIOGRAM TRACING: CPT | Performed by: EMERGENCY MEDICINE

## 2024-06-16 PROCEDURE — 700102 HCHG RX REV CODE 250 W/ 637 OVERRIDE(OP): Performed by: EMERGENCY MEDICINE

## 2024-06-16 PROCEDURE — 99284 EMERGENCY DEPT VISIT MOD MDM: CPT

## 2024-06-16 PROCEDURE — 85025 COMPLETE CBC W/AUTO DIFF WBC: CPT

## 2024-06-16 PROCEDURE — 83880 ASSAY OF NATRIURETIC PEPTIDE: CPT

## 2024-06-16 PROCEDURE — 700111 HCHG RX REV CODE 636 W/ 250 OVERRIDE (IP): Mod: JZ | Performed by: EMERGENCY MEDICINE

## 2024-06-16 PROCEDURE — 94640 AIRWAY INHALATION TREATMENT: CPT

## 2024-06-16 PROCEDURE — 71045 X-RAY EXAM CHEST 1 VIEW: CPT

## 2024-06-16 PROCEDURE — 80053 COMPREHEN METABOLIC PANEL: CPT

## 2024-06-16 PROCEDURE — A9270 NON-COVERED ITEM OR SERVICE: HCPCS | Performed by: EMERGENCY MEDICINE

## 2024-06-16 PROCEDURE — 36415 COLL VENOUS BLD VENIPUNCTURE: CPT

## 2024-06-16 PROCEDURE — 0241U HCHG SARS-COV-2 COVID-19 NFCT DS RESP RNA 4 TRGT MIC: CPT

## 2024-06-16 PROCEDURE — 84484 ASSAY OF TROPONIN QUANT: CPT

## 2024-06-16 PROCEDURE — 87651 STREP A DNA AMP PROBE: CPT

## 2024-06-16 PROCEDURE — 96374 THER/PROPH/DIAG INJ IV PUSH: CPT

## 2024-06-16 RX ORDER — METHYLPREDNISOLONE 4 MG/1
TABLET ORAL
Qty: 21 TABLET | Refills: 0 | Status: SHIPPED | OUTPATIENT
Start: 2024-06-16

## 2024-06-16 RX ORDER — DOXYCYCLINE 100 MG/1
100 CAPSULE ORAL 2 TIMES DAILY
Qty: 10 CAPSULE | Refills: 0 | Status: ACTIVE | OUTPATIENT
Start: 2024-06-16 | End: 2024-06-21

## 2024-06-16 RX ORDER — ALBUTEROL SULFATE 90 UG/1
2 AEROSOL, METERED RESPIRATORY (INHALATION) EVERY 6 HOURS PRN
Qty: 8.5 G | Refills: 1 | Status: SHIPPED | OUTPATIENT
Start: 2024-06-16

## 2024-06-16 RX ORDER — METHYLPREDNISOLONE SODIUM SUCCINATE 40 MG/ML
40 INJECTION, POWDER, LYOPHILIZED, FOR SOLUTION INTRAMUSCULAR; INTRAVENOUS ONCE
Status: COMPLETED | OUTPATIENT
Start: 2024-06-16 | End: 2024-06-16

## 2024-06-16 RX ORDER — BENZONATATE 100 MG/1
100 CAPSULE ORAL ONCE
Status: COMPLETED | OUTPATIENT
Start: 2024-06-16 | End: 2024-06-16

## 2024-06-16 RX ORDER — ACETAMINOPHEN 500 MG
1000 TABLET ORAL ONCE
Status: COMPLETED | OUTPATIENT
Start: 2024-06-16 | End: 2024-06-16

## 2024-06-16 RX ORDER — IPRATROPIUM BROMIDE AND ALBUTEROL SULFATE 2.5; .5 MG/3ML; MG/3ML
3 SOLUTION RESPIRATORY (INHALATION)
Status: COMPLETED | OUTPATIENT
Start: 2024-06-16 | End: 2024-06-16

## 2024-06-16 RX ORDER — DOXYCYCLINE 100 MG/1
100 TABLET ORAL ONCE
Status: COMPLETED | OUTPATIENT
Start: 2024-06-16 | End: 2024-06-16

## 2024-06-16 RX ADMIN — IPRATROPIUM BROMIDE AND ALBUTEROL SULFATE 3 ML: .5; 3 SOLUTION RESPIRATORY (INHALATION) at 23:00

## 2024-06-16 RX ADMIN — BENZONATATE 100 MG: 100 CAPSULE ORAL at 23:15

## 2024-06-16 RX ADMIN — ACETAMINOPHEN 1000 MG: 500 TABLET, FILM COATED ORAL at 23:15

## 2024-06-16 RX ADMIN — DOXYCYCLINE 100 MG: 100 TABLET, FILM COATED ORAL at 23:35

## 2024-06-16 RX ADMIN — METHYLPREDNISOLONE SODIUM SUCCINATE 40 MG: 40 INJECTION, POWDER, FOR SOLUTION INTRAMUSCULAR; INTRAVENOUS at 22:45

## 2024-06-16 ASSESSMENT — FIBROSIS 4 INDEX: FIB4 SCORE: 1.48

## 2024-06-17 NOTE — ED TRIAGE NOTES
"Chief Complaint   Patient presents with    Flu Like Symptoms     PT presents d/t cough, sob, and increased mucus production x 2 days      BP (!) 154/105   Pulse (!) 110   Temp 37.4 °C (99.4 °F) (Temporal)   Resp (!) 22   Ht 1.702 m (5' 7\")   Wt 75 kg (165 lb 4.8 oz)   SpO2 93%   BMI 25.89 kg/m²     "

## 2024-06-17 NOTE — ED NOTES
PT verbalizes understanding of discharge instructions. PT ambulates to lobby with steady gate accompanied by .

## 2024-06-17 NOTE — ED PROVIDER NOTES
ED Provider Note    CHIEF COMPLAINT  Chief Complaint   Patient presents with    Flu Like Symptoms     PT presents d/t cough, sob, and increased mucus production x 2 days        EXTERNAL RECORDS REVIEWED  Outpatient Notes reviewed office visit progress note dated April 14, 2023 by Dr. Hunt.  Patient seen for cough.  Started on Medrol and albuterol.    HPI/ROS  LIMITATION TO HISTORY   Select: : None  OUTSIDE HISTORIAN(S):  Significant other amenable to plan of care    Susy Wheeler is a 63 y.o. female who presents for evaluation of shortness of breath.  Patient relates she has been ill for the last 2 days.  Endorses cough productive of green sputum.  No known fever, nausea, no vomiting.  Does relate bitemporal headache.  No chest pain, she does endorse mild sore throat which is sharp in character.  She relates she became more out of breath this evening just prior to arrival.  She relates wheezing with her cough, given difficulty breathing she came to be assessed.  She has no history of asthma nor COPD, she is a former smoker having quit in 1986.  Shortness of breath at rest exacerbated by mild exertion.    PAST MEDICAL HISTORY   has a past medical history of Enlarged thyroid gland (4/5/2021), Hyperlipidemia, Prediabetes, and Urinary tract infection.    SURGICAL HISTORY   has a past surgical history that includes gyn surgery (2001).    FAMILY HISTORY  Family History   Problem Relation Age of Onset    COPD Mother     Hypertension Father     Stroke Father     Hypertension Sister     Hypertension Brother     Arthritis Brother     COPD Brother     Cancer Paternal Aunt         cervix cancer    Cancer Maternal Grandmother         chin and mouth    Heart Disease Maternal Grandfather     Hypertension Brother     Anxiety disorder Brother     Hypertension Sister     Cancer Sister         Pancreatic cancer    Cancer Paternal Aunt         breast       SOCIAL HISTORY  Social History     Tobacco Use    Smoking status: Former      "Current packs/day: 0.00     Average packs/day: 0.3 packs/day for 4.0 years (1.0 ttl pk-yrs)     Types: Cigarettes     Start date: 1982     Quit date: 1986     Years since quittin.2    Smokeless tobacco: Never    Tobacco comments:     smoked 4 years in college, 3 per day social   Vaping Use    Vaping status: Never Used   Substance and Sexual Activity    Alcohol use: Yes     Comment: socially - wine    Drug use: Not Currently    Sexual activity: Not on file       CURRENT MEDICATIONS  Home Medications       Reviewed by Home Felix R.N. (Registered Nurse) on 24 at 2211  Med List Status: Not Addressed     Medication Last Dose Status   albuterol 108 (90 Base) MCG/ACT Aero Soln inhalation aerosol  Active                  Audit from Redirected Encounters    **Home medications have not yet been reviewed for this encounter**         ALLERGIES  No Known Allergies    PHYSICAL EXAM  VITAL SIGNS: /67   Pulse 100   Temp 37.4 °C (99.4 °F) (Temporal)   Resp (!) 23   Ht 1.702 m (5' 7\")   Wt 75 kg (165 lb 4.8 oz)   SpO2 95%   BMI 25.89 kg/m²    General: Alert, mild acute distress  Skin: Warm, dry, normal for ethnicity  Head: Normocephalic, atraumatic  Neck: Trachea midline, no tenderness  Eye: PERRL, normal conjunctiva  ENMT: Oral mucosa moist, no pharyngeal erythema or exudate  Cardiovascular: S1, S2, mildly tachycardic regular rate and rhythm, No murmur, Normal peripheral perfusion  Respiratory: Lungs demonstrate coarseness on expiration in all lung fields, no Rales, no rhonchi, respirations are mildly tachypneic but otherwise non-labored, breath sounds are equal  Musculoskeletal: No swelling, no deformity.  No pitting peripheral edema.  Neurological: Alert and oriented to person, place, time, and situation  Lymphatics: No lymphadenopathy  Psychiatric: Cooperative, appropriate mood & affect     EKG/LABS  Results for orders placed or performed during the hospital encounter of 24   CoV-2, " FLU A/B, and RSV by PCR (2-4 Hours CEPHEID) : Collect NP swab in VTM    Specimen: Nasal; Respirate   Result Value Ref Range    Influenza virus A RNA Negative Negative    Influenza virus B, PCR Negative Negative    RSV, PCR Negative Negative    SARS-CoV-2 by PCR NotDetected     SARS-CoV-2 Source Nasal Swab    Group A Strep by PCR    Specimen: Throat   Result Value Ref Range    Group A Strep by PCR Not Detected Not Detected   CBC w/ Differential   Result Value Ref Range    WBC 10.8 4.8 - 10.8 K/uL    RBC 4.74 4.20 - 5.40 M/uL    Hemoglobin 14.6 12.0 - 16.0 g/dL    Hematocrit 43.6 37.0 - 47.0 %    MCV 92.0 81.4 - 97.8 fL    MCH 30.8 27.0 - 33.0 pg    MCHC 33.5 32.2 - 35.5 g/dL    RDW 44.1 35.9 - 50.0 fL    Platelet Count 287 164 - 446 K/uL    MPV 10.5 9.0 - 12.9 fL    Neutrophils-Polys 75.50 (H) 44.00 - 72.00 %    Lymphocytes 14.00 (L) 22.00 - 41.00 %    Monocytes 8.60 0.00 - 13.40 %    Eosinophils 0.80 0.00 - 6.90 %    Basophils 0.70 0.00 - 1.80 %    Immature Granulocytes 0.40 0.00 - 0.90 %    Nucleated RBC 0.00 0.00 - 0.20 /100 WBC    Neutrophils (Absolute) 8.16 (H) 1.82 - 7.42 K/uL    Lymphs (Absolute) 1.51 1.00 - 4.80 K/uL    Monos (Absolute) 0.93 (H) 0.00 - 0.85 K/uL    Eos (Absolute) 0.09 0.00 - 0.51 K/uL    Baso (Absolute) 0.08 0.00 - 0.12 K/uL    Immature Granulocytes (abs) 0.04 0.00 - 0.11 K/uL    NRBC (Absolute) 0.00 K/uL   Complete Metabolic Panel (CMP)   Result Value Ref Range    Sodium 139 135 - 145 mmol/L    Potassium 3.7 3.6 - 5.5 mmol/L    Chloride 105 96 - 112 mmol/L    Co2 23 20 - 33 mmol/L    Anion Gap 11.0 7.0 - 16.0    Glucose 111 (H) 65 - 99 mg/dL    Bun 13 8 - 22 mg/dL    Creatinine 0.81 0.50 - 1.40 mg/dL    Calcium 8.9 8.4 - 10.2 mg/dL    Correct Calcium 8.7 8.5 - 10.5 mg/dL    AST(SGOT) 16 12 - 45 U/L    ALT(SGPT) 19 2 - 50 U/L    Alkaline Phosphatase 101 (H) 30 - 99 U/L    Total Bilirubin 0.6 0.1 - 1.5 mg/dL    Albumin 4.2 3.2 - 4.9 g/dL    Total Protein 7.4 6.0 - 8.2 g/dL    Globulin 3.2 1.9  - 3.5 g/dL    A-G Ratio 1.3 g/dL   Troponin - STAT Once   Result Value Ref Range    Troponin T 7 6 - 19 ng/L   LACTIC ACID   Result Value Ref Range    Lactic Acid 1.3 0.5 - 2.0 mmol/L   proBrain Natriuretic Peptide, NT   Result Value Ref Range    NT-proBNP 112 0 - 125 pg/mL   ESTIMATED GFR   Result Value Ref Range    GFR (CKD-EPI) 81 >60 mL/min/1.73 m 2   EKG   Result Value Ref Range    Report       [facility] Emergency Dept.    Test Date:  2024  Pt Name:    DANIKA SALVADOR                Department: [facility]  MRN:        [id number]                      [facility]  Gender:     Female                       Technician: ACR  :        1960                   Requested By:TIMMY RAMIREZ  Order #:    [id number]                    Reading MD: TIMMY RAMIREZ MD    Measurements  Intervals                                Axis  Rate:       86                           P:          54  NM:         167                          QRS:        -36  QRSD:       90                           T:          48  QT:         371  QTc:        444    Interpretive Statements  Sinus rhythm  Left axis deviation  No previous ECG available for comparison  Electronically Signed On 2024 22:47:20 PDT by TIMMY RAMIREZ MD        I have independently interpreted this EKG    RADIOLOGY/PROCEDURES   I have independently interpreted the diagnostic imaging associated with this visit and am waiting the final reading from the radiologist.   My preliminary interpretation is as follows: No lobar infiltrate    Radiologist interpretation:  DX-CHEST-PORTABLE (1 VIEW)   Final Result         1.  No acute cardiopulmonary disease.          COURSE & MEDICAL DECISION MAKING    ASSESSMENT, COURSE AND PLAN  Care Narrative: Nontoxic well in appearance but dyspneic 63-year-old presents for evaluation of productive cough and feeling generally ill.  Reassuringly she is afebrile, she has no leukocytosis, no  lactic acidosis, no bandemia.  This would not be consistent with sepsis.  She has no chest pain and EKG is nonischemic, troponin well within normal limits, this would not be considered acute coronary syndrome.  Patient is a former smoker and has a productive cough; history and exam as above.  Reassuringly no evidence of sepsis and given not hypoxic and work of breathing improving there is no indication for inpatient management.  Patient does well with steroids and albuterol here in the ED and will be written for the same.  Doxycycline initiated for coverage of lower respiratory infection.    Chest Pain:   Heart score is 2, low risk stratification  ED OBS: Yes; I am placing the patient in to an observation status due to a diagnostic uncertainty as well as therapeutic intensity. Patient placed in observation status at 10:10 PM, 6/16/2024.     Observation plan is as follows: Patient medicated with nebulized DuoNeb, Solu-Medrol 40 mg IV.  Tessalon 100 mg p.o. for cough.  Metabolic/cardiac workup as well as lactic acid levels will be obtained.  Chest x-ray will be obtained.  Differential diagnosis at this point includes but is not restricted to pneumonia, bronchitis, lower respiratory infection, influenza, COVID-19    2319: Patient reassessed, cough is improved.  She is still mildly tachypneic but work of breathing otherwise is improved.  Repeat pulmonary exam demonstrates resolution of her coarse expiratory breath sounds.  Air movement much improved.  I have updated her with reassuring labs thus far, awaiting x-ray result at this time.    2327: Patient reassessed, updated with x-ray result.  Thankfully no evidence of lobar infiltrate.  Clinically I am concerned for early pneumonia/lower respiratory infection and given patient is a previous smoker I we will treat her with doxycycline, initial dose given here 100 g p.o.    Upon Reevaluation, the patient's condition has: Improved; and will be discharged.    Patient  "discharged from ED Observation status at 2329 (Time) 6/16/24 (Date).         Patient Vitals for the past 24 hrs:   BP Temp Temp src Pulse Resp SpO2 Height Weight   06/16/24 2317 -- -- -- 100 (!) 23 95 % -- --   06/16/24 2304 -- -- -- 89 15 100 % -- --   06/16/24 2301 -- -- -- 89 (!) 28 95 % -- --   06/16/24 2300 -- -- -- 88 15 98 % -- --   06/16/24 2244 133/67 -- -- 88 19 99 % -- --   06/16/24 2239 -- -- -- 89 -- 97 % -- --   06/16/24 2229 131/69 -- -- 90 -- 96 % -- --   06/16/24 2201 (!) 154/105 37.4 °C (99.4 °F) Temporal (!) 110 (!) 22 93 % 1.702 m (5' 7\") 75 kg (165 lb 4.8 oz)        ADDITIONAL PROBLEMS MANAGED  Productive cough, dyspnea    DISPOSITION AND DISCUSSIONS  I have discussed management of the patient with the following physicians and IRINEO's:  NA    Discussion of management with other [facility] or appropriate source(s): None     Escalation of care considered, and ultimately not performed:acute inpatient care management, however at this time, the patient is most appropriate for outpatient management    Barriers to care at this time, including but not limited to:  NA .     Decision tools and prescription drugs considered including, but not limited to: Antibiotics doxycycline initiated for coverage of lower respiratory infection .    The patient will return for new or worsening symptoms and is stable at the time of discharge.    Patient has had high blood pressure while in the emergency department, felt likely secondary to medical condition. Counseled patient to monitor blood pressure at home and follow up with primary care physician.      DISPOSITION:  Patient will be discharged home in stable condition.    FOLLOW UP:  Holland Hunt D.O.  [address] [id number] [phone]    Schedule an appointment as soon as possible for a visit         OUTPATIENT MEDICATIONS:  New Prescriptions    ALBUTEROL 108 (90 BASE) MCG/ACT AERO SOLN INHALATION AEROSOL    Inhale 2 Puffs every 6 hours as " needed for Shortness of Breath.    DOXYCYCLINE (MONODOX) 100 MG CAPSULE    Take 1 Capsule by mouth 2 times a day for 5 days.    METHYLPREDNISOLONE (MEDROL DOSEPAK) 4 MG TABLET THERAPY PACK    Use as directed          FINAL DIAGNOSIS  1. Acute lower respiratory infection    2. Bronchospasm, acute           Electronically signed by: Batool Rogers M.D., 6/16/2024 10:09 PM

## 2024-06-26 ENCOUNTER — APPOINTMENT (OUTPATIENT)
Dept: MEDICAL GROUP | Facility: LAB | Age: 64
End: 2024-06-26
Payer: COMMERCIAL

## 2024-06-26 VITALS
RESPIRATION RATE: 14 BRPM | OXYGEN SATURATION: 98 % | WEIGHT: 164.2 LBS | HEART RATE: 68 BPM | TEMPERATURE: 97.8 F | HEIGHT: 67 IN | BODY MASS INDEX: 25.77 KG/M2 | DIASTOLIC BLOOD PRESSURE: 68 MMHG | SYSTOLIC BLOOD PRESSURE: 128 MMHG

## 2024-06-26 DIAGNOSIS — J22 ACUTE LOWER RESPIRATORY INFECTION: ICD-10-CM

## 2024-06-26 PROCEDURE — 99213 OFFICE O/P EST LOW 20 MIN: CPT | Performed by: STUDENT IN AN ORGANIZED HEALTH CARE EDUCATION/TRAINING PROGRAM

## 2024-06-26 PROCEDURE — 3078F DIAST BP <80 MM HG: CPT | Performed by: STUDENT IN AN ORGANIZED HEALTH CARE EDUCATION/TRAINING PROGRAM

## 2024-06-26 PROCEDURE — 3074F SYST BP LT 130 MM HG: CPT | Performed by: STUDENT IN AN ORGANIZED HEALTH CARE EDUCATION/TRAINING PROGRAM

## 2024-06-26 SDOH — ECONOMIC STABILITY: FOOD INSECURITY: WITHIN THE PAST 12 MONTHS, YOU WORRIED THAT YOUR FOOD WOULD RUN OUT BEFORE YOU GOT MONEY TO BUY MORE.: NEVER TRUE

## 2024-06-26 SDOH — ECONOMIC STABILITY: HOUSING INSECURITY: IN THE LAST 12 MONTHS, HOW MANY PLACES HAVE YOU LIVED?: 1

## 2024-06-26 SDOH — ECONOMIC STABILITY: FOOD INSECURITY: WITHIN THE PAST 12 MONTHS, THE FOOD YOU BOUGHT JUST DIDN'T LAST AND YOU DIDN'T HAVE MONEY TO GET MORE.: NEVER TRUE

## 2024-06-26 SDOH — HEALTH STABILITY: PHYSICAL HEALTH: ON AVERAGE, HOW MANY DAYS PER WEEK DO YOU ENGAGE IN MODERATE TO STRENUOUS EXERCISE (LIKE A BRISK WALK)?: 3 DAYS

## 2024-06-26 SDOH — HEALTH STABILITY: PHYSICAL HEALTH: ON AVERAGE, HOW MANY MINUTES DO YOU ENGAGE IN EXERCISE AT THIS LEVEL?: 30 MIN

## 2024-06-26 SDOH — ECONOMIC STABILITY: INCOME INSECURITY: IN THE LAST 12 MONTHS, WAS THERE A TIME WHEN YOU WERE NOT ABLE TO PAY THE MORTGAGE OR RENT ON TIME?: NO

## 2024-06-26 SDOH — ECONOMIC STABILITY: INCOME INSECURITY: HOW HARD IS IT FOR YOU TO PAY FOR THE VERY BASICS LIKE FOOD, HOUSING, MEDICAL CARE, AND HEATING?: NOT HARD AT ALL

## 2024-06-26 ASSESSMENT — SOCIAL DETERMINANTS OF HEALTH (SDOH)
HOW HARD IS IT FOR YOU TO PAY FOR THE VERY BASICS LIKE FOOD, HOUSING, MEDICAL CARE, AND HEATING?: NOT HARD AT ALL
WITHIN THE PAST 12 MONTHS, YOU WORRIED THAT YOUR FOOD WOULD RUN OUT BEFORE YOU GOT THE MONEY TO BUY MORE: NEVER TRUE
HOW OFTEN DO YOU GET TOGETHER WITH FRIENDS OR RELATIVES?: ONCE A WEEK
IN A TYPICAL WEEK, HOW MANY TIMES DO YOU TALK ON THE PHONE WITH FAMILY, FRIENDS, OR NEIGHBORS?: MORE THAN THREE TIMES A WEEK
HOW MANY DRINKS CONTAINING ALCOHOL DO YOU HAVE ON A TYPICAL DAY WHEN YOU ARE DRINKING: 1 OR 2
HOW OFTEN DO YOU HAVE A DRINK CONTAINING ALCOHOL: 2-4 TIMES A MONTH
IN A TYPICAL WEEK, HOW MANY TIMES DO YOU TALK ON THE PHONE WITH FAMILY, FRIENDS, OR NEIGHBORS?: MORE THAN THREE TIMES A WEEK
HOW OFTEN DO YOU ATTEND CHURCH OR RELIGIOUS SERVICES?: NEVER
HOW OFTEN DO YOU ATTENT MEETINGS OF THE CLUB OR ORGANIZATION YOU BELONG TO?: 1 TO 4 TIMES PER YEAR
HOW OFTEN DO YOU ATTENT MEETINGS OF THE CLUB OR ORGANIZATION YOU BELONG TO?: 1 TO 4 TIMES PER YEAR
HOW OFTEN DO YOU ATTEND CHURCH OR RELIGIOUS SERVICES?: NEVER
DO YOU BELONG TO ANY CLUBS OR ORGANIZATIONS SUCH AS CHURCH GROUPS UNIONS, FRATERNAL OR ATHLETIC GROUPS, OR SCHOOL GROUPS?: YES
DO YOU BELONG TO ANY CLUBS OR ORGANIZATIONS SUCH AS CHURCH GROUPS UNIONS, FRATERNAL OR ATHLETIC GROUPS, OR SCHOOL GROUPS?: YES
IN THE PAST 12 MONTHS, HAS THE ELECTRIC, GAS, OIL, OR WATER COMPANY THREATENED TO SHUT OFF SERVICE IN YOUR HOME?: NO
HOW OFTEN DO YOU HAVE SIX OR MORE DRINKS ON ONE OCCASION: NEVER
HOW OFTEN DO YOU GET TOGETHER WITH FRIENDS OR RELATIVES?: ONCE A WEEK

## 2024-06-26 ASSESSMENT — LIFESTYLE VARIABLES
AUDIT-C TOTAL SCORE: 2
HOW MANY STANDARD DRINKS CONTAINING ALCOHOL DO YOU HAVE ON A TYPICAL DAY: 1 OR 2
HOW OFTEN DO YOU HAVE SIX OR MORE DRINKS ON ONE OCCASION: NEVER
SKIP TO QUESTIONS 9-10: 1
HOW OFTEN DO YOU HAVE A DRINK CONTAINING ALCOHOL: 2-4 TIMES A MONTH

## 2024-06-26 ASSESSMENT — ENCOUNTER SYMPTOMS
CHILLS: 0
COUGH: 1
SPUTUM PRODUCTION: 0
FEVER: 0

## 2024-06-26 ASSESSMENT — PATIENT HEALTH QUESTIONNAIRE - PHQ9: CLINICAL INTERPRETATION OF PHQ2 SCORE: 0

## 2024-06-26 ASSESSMENT — FIBROSIS 4 INDEX: FIB4 SCORE: 0.81

## 2024-06-26 NOTE — PROGRESS NOTES
"Subjective:     CC: ER follow up   Chief Complaint   Patient presents with    Follow-Up        HPI:       Problem   Acute Lower Respiratory Infection    Patient on 6/16 visited the ER due to productive green cough.  Her white blood cell count was near the upper limit of normal.  She was given doxycycline along with Medrol dose pack and reports improvement in her symptoms.  She reports that she is currently in 95%. She has completed her medications          Current Outpatient Medications Ordered in Epic   Medication Sig Dispense Refill    albuterol 108 (90 Base) MCG/ACT Aero Soln inhalation aerosol Inhale 2 Puffs every 6 hours as needed for Shortness of Breath. 8.5 g 1    methylPREDNISolone (MEDROL DOSEPAK) 4 MG Tablet Therapy Pack Use as directed 21 Tablet 0     No current Epic-ordered facility-administered medications on file.           ROS:  Review of Systems   Constitutional:  Negative for chills, fever and malaise/fatigue.   HENT:  Positive for congestion.    Respiratory:  Positive for cough. Negative for sputum production.        Objective:     Exam:  /68 (BP Location: Right arm, Patient Position: Sitting, BP Cuff Size: Adult)   Pulse 68   Temp 36.6 °C (97.8 °F) (Temporal)   Resp 14   Ht 1.702 m (5' 7\")   Wt 74.5 kg (164 lb 3.2 oz)   SpO2 98%   BMI 25.72 kg/m²  Body mass index is 25.72 kg/m².    Physical Exam  Constitutional:       General: She is not in acute distress.     Appearance: She is not ill-appearing.   Pulmonary:      Effort: Pulmonary effort is normal. No respiratory distress.      Breath sounds: No wheezing, rhonchi or rales.   Neurological:      Mental Status: She is alert.   Psychiatric:         Mood and Affect: Mood normal.         Behavior: Behavior normal.         Thought Content: Thought content normal.         Judgment: Judgment normal.                   Assessment & Plan:     Problem List Items Addressed This Visit       Acute lower respiratory infection     " Acute-improved  -patient will notify office if her symptoms do not resolve               3+ labs reviewed from ER    Please note that this dictation was created using voice recognition software. I have made every reasonable attempt to correct obvious errors, but I expect that there are errors of grammar and possibly content that I did not discover before finalizing the note.

## 2024-09-05 SDOH — ECONOMIC STABILITY: INCOME INSECURITY: IN THE LAST 12 MONTHS, WAS THERE A TIME WHEN YOU WERE NOT ABLE TO PAY THE MORTGAGE OR RENT ON TIME?: NO

## 2024-09-05 SDOH — ECONOMIC STABILITY: FOOD INSECURITY: WITHIN THE PAST 12 MONTHS, THE FOOD YOU BOUGHT JUST DIDN'T LAST AND YOU DIDN'T HAVE MONEY TO GET MORE.: NEVER TRUE

## 2024-09-05 SDOH — ECONOMIC STABILITY: FOOD INSECURITY: WITHIN THE PAST 12 MONTHS, YOU WORRIED THAT YOUR FOOD WOULD RUN OUT BEFORE YOU GOT MONEY TO BUY MORE.: NEVER TRUE

## 2024-09-05 SDOH — HEALTH STABILITY: PHYSICAL HEALTH: ON AVERAGE, HOW MANY MINUTES DO YOU ENGAGE IN EXERCISE AT THIS LEVEL?: 20 MIN

## 2024-09-05 SDOH — HEALTH STABILITY: PHYSICAL HEALTH

## 2024-09-05 ASSESSMENT — LIFESTYLE VARIABLES
AUDIT-C TOTAL SCORE: 2
HOW MANY STANDARD DRINKS CONTAINING ALCOHOL DO YOU HAVE ON A TYPICAL DAY: 1 OR 2
HOW OFTEN DO YOU HAVE A DRINK CONTAINING ALCOHOL: 2-4 TIMES A MONTH
HOW OFTEN DO YOU HAVE SIX OR MORE DRINKS ON ONE OCCASION: NEVER
SKIP TO QUESTIONS 9-10: 1

## 2024-09-05 ASSESSMENT — SOCIAL DETERMINANTS OF HEALTH (SDOH)
HOW OFTEN DO YOU HAVE SIX OR MORE DRINKS ON ONE OCCASION: NEVER
HOW MANY DRINKS CONTAINING ALCOHOL DO YOU HAVE ON A TYPICAL DAY WHEN YOU ARE DRINKING: 1 OR 2
HOW OFTEN DO YOU GET TOGETHER WITH FRIENDS OR RELATIVES?: THREE TIMES A WEEK
HOW OFTEN DO YOU ATTENT MEETINGS OF THE CLUB OR ORGANIZATION YOU BELONG TO?: 1 TO 4 TIMES PER YEAR
IN A TYPICAL WEEK, HOW MANY TIMES DO YOU TALK ON THE PHONE WITH FAMILY, FRIENDS, OR NEIGHBORS?: MORE THAN THREE TIMES A WEEK
WITHIN THE PAST 12 MONTHS, YOU WORRIED THAT YOUR FOOD WOULD RUN OUT BEFORE YOU GOT THE MONEY TO BUY MORE: NEVER TRUE
HOW OFTEN DO YOU GET TOGETHER WITH FRIENDS OR RELATIVES?: THREE TIMES A WEEK
HOW OFTEN DO YOU ATTEND CHURCH OR RELIGIOUS SERVICES?: NEVER
HOW OFTEN DO YOU ATTEND CHURCH OR RELIGIOUS SERVICES?: NEVER
HOW OFTEN DO YOU ATTENT MEETINGS OF THE CLUB OR ORGANIZATION YOU BELONG TO?: 1 TO 4 TIMES PER YEAR
IN THE PAST 12 MONTHS, HAS THE ELECTRIC, GAS, OIL, OR WATER COMPANY THREATENED TO SHUT OFF SERVICE IN YOUR HOME?: NO
IN A TYPICAL WEEK, HOW MANY TIMES DO YOU TALK ON THE PHONE WITH FAMILY, FRIENDS, OR NEIGHBORS?: MORE THAN THREE TIMES A WEEK
HOW OFTEN DO YOU HAVE A DRINK CONTAINING ALCOHOL: 2-4 TIMES A MONTH

## 2024-09-06 ENCOUNTER — APPOINTMENT (OUTPATIENT)
Dept: MEDICAL GROUP | Facility: LAB | Age: 64
End: 2024-09-06
Payer: COMMERCIAL

## 2024-09-06 VITALS
DIASTOLIC BLOOD PRESSURE: 68 MMHG | WEIGHT: 166.67 LBS | RESPIRATION RATE: 14 BRPM | HEIGHT: 67 IN | HEART RATE: 72 BPM | BODY MASS INDEX: 26.16 KG/M2 | OXYGEN SATURATION: 98 % | TEMPERATURE: 97.8 F | SYSTOLIC BLOOD PRESSURE: 126 MMHG

## 2024-09-06 DIAGNOSIS — E04.1 RIGHT THYROID NODULE: ICD-10-CM

## 2024-09-06 DIAGNOSIS — Z12.31 ENCOUNTER FOR SCREENING MAMMOGRAM FOR MALIGNANT NEOPLASM OF BREAST: ICD-10-CM

## 2024-09-06 DIAGNOSIS — R73.03 PREDIABETES: Chronic | ICD-10-CM

## 2024-09-06 DIAGNOSIS — M85.88 OSTEOPENIA OF LUMBAR SPINE: ICD-10-CM

## 2024-09-06 DIAGNOSIS — R01.1 SYSTOLIC MURMUR: ICD-10-CM

## 2024-09-06 DIAGNOSIS — E06.3 CHRONIC LYMPHOCYTIC THYROIDITIS: ICD-10-CM

## 2024-09-06 DIAGNOSIS — Z00.00 HEALTHCARE MAINTENANCE: ICD-10-CM

## 2024-09-06 DIAGNOSIS — E78.2 MIXED HYPERLIPIDEMIA: Chronic | ICD-10-CM

## 2024-09-06 DIAGNOSIS — R10.11 RUQ PAIN: ICD-10-CM

## 2024-09-06 PROCEDURE — 99396 PREV VISIT EST AGE 40-64: CPT | Performed by: STUDENT IN AN ORGANIZED HEALTH CARE EDUCATION/TRAINING PROGRAM

## 2024-09-06 PROCEDURE — 3078F DIAST BP <80 MM HG: CPT | Performed by: STUDENT IN AN ORGANIZED HEALTH CARE EDUCATION/TRAINING PROGRAM

## 2024-09-06 PROCEDURE — 3074F SYST BP LT 130 MM HG: CPT | Performed by: STUDENT IN AN ORGANIZED HEALTH CARE EDUCATION/TRAINING PROGRAM

## 2024-09-06 ASSESSMENT — ENCOUNTER SYMPTOMS
CHILLS: 0
FEVER: 0
SHORTNESS OF BREATH: 0

## 2024-09-06 ASSESSMENT — FIBROSIS 4 INDEX: FIB4 SCORE: 0.82

## 2024-09-06 NOTE — PROGRESS NOTES
Subjective:     CC:   Chief Complaint   Patient presents with    Annual Exam       HPI:   Susy Wheeler is a 64 y.o. female who presents for annual exam    Patient has GYN provider: No   Last Pap Smear: unable to recall   H/O Abnormal Pap: yes, but had total hysterectomy including cervix   Last Mammogram:   Last Bone Density Test:   Last Colorectal Cancer Screenin  Last Tdap:   Received HPV series: Aged out     Exercise: no regular exercise   Diet: trying to eat low carb diet      No LMP recorded. Patient has had a hysterectomy.  She has not utilized hormone replacement therapy.  Denies any menopausal symptoms.  No significant bloating/fluid retention, pelvic pain, or dyspareunia. No abnormal vaginal discharge.   No breast tenderness, mass, nipple discharge or changes in size or contour.    OB History    Para Term  AB Living   0 0 0 0 0 0   SAB IAB Ectopic Molar Multiple Live Births   0 0 0 0 0 0      She  has no history on file for sexual activity.    She  has a past medical history of Enlarged thyroid gland (2021), Hyperlipidemia, Prediabetes, and Urinary tract infection.    She has no past medical history of Asthma, Hypertension, or Kidney disease.  She  has a past surgical history that includes gyn surgery ().    Family History   Problem Relation Age of Onset    COPD Mother     Hypertension Father     Stroke Father     Hypertension Sister     Hypertension Brother     Arthritis Brother     COPD Brother     Cancer Paternal Aunt         cervix cancer    Cancer Maternal Grandmother         chin and mouth    Heart Disease Maternal Grandfather     Hypertension Brother     Anxiety disorder Brother     Hypertension Sister     Cancer Sister         Pancreatic cancer    Cancer Paternal Aunt         breast     Social History     Tobacco Use    Smoking status: Former     Current packs/day: 0.00     Average packs/day: 0.3 packs/day for 4.0 years (1.0 ttl pk-yrs)     Types: Cigarettes  "    Start date: 1982     Quit date: 1986     Years since quittin.4    Smokeless tobacco: Never    Tobacco comments:     smoked 4 years in college, 3 per day social   Vaping Use    Vaping status: Never Used   Substance Use Topics    Alcohol use: Yes     Comment: socially - wine    Drug use: Not Currently       Patient Active Problem List    Diagnosis Date Noted    Chronic lymphocytic thyroiditis 2024    Osteopenia of lumbar spine 2024    Acute lower respiratory infection 2024    Annual physical exam 2023    Systolic murmur 2023    Post-nasal drip 2023    Colon polyps 2022    Abnormal mammogram 2021    Family history of pancreatic cancer 2021    Vitamin D deficiency 2021    Right thyroid nodule 2021    Mixed hyperlipidemia 2019    Prediabetes 2019     Current Outpatient Medications   Medication Sig Dispense Refill    albuterol 108 (90 Base) MCG/ACT Aero Soln inhalation aerosol Inhale 2 Puffs every 6 hours as needed for Shortness of Breath. 8.5 g 1    methylPREDNISolone (MEDROL DOSEPAK) 4 MG Tablet Therapy Pack Use as directed (Patient not taking: Reported on 2024) 21 Tablet 0     No current facility-administered medications for this visit.     No Known Allergies    Review of Systems   Review of Systems   Constitutional:  Negative for chills and fever.   Respiratory:  Negative for shortness of breath.    Cardiovascular:  Negative for chest pain.         Objective:   /68 (BP Location: Right arm, Patient Position: Sitting, BP Cuff Size: Adult)   Pulse 72   Temp 36.6 °C (97.8 °F) (Temporal)   Resp 14   Ht 1.702 m (5' 7\")   Wt 75.6 kg (166 lb 10.7 oz)   SpO2 98%   BMI 26.10 kg/m²     Wt Readings from Last 4 Encounters:   24 75.6 kg (166 lb 10.7 oz)   24 74.5 kg (164 lb 3.2 oz)   24 75 kg (165 lb 4.8 oz)   23 74.5 kg (164 lb 3.9 oz)         Physical Exam:  Physical Exam  Constitutional:       " General: She is not in acute distress.     Appearance: She is not ill-appearing.   Pulmonary:      Effort: Pulmonary effort is normal.   Neurological:      Mental Status: She is alert.   Psychiatric:         Mood and Affect: Mood normal.         Behavior: Behavior normal.         Thought Content: Thought content normal.         Judgment: Judgment normal.         Right handed   Strength: R 64lbs L 48lbs of force     Assessment and Plan:     1. Chronic lymphocytic thyroiditis  - US-THYROID; Future    2. Right thyroid nodule  - US-THYROID; Future    3. Encounter for screening mammogram for malignant neoplasm of breast  - MA-SCREENING MAMMO BILAT W/TOMOSYNTHESIS W/CAD; Future    4. Osteopenia of lumbar spine    5. Systolic murmur  - EC-ECHOCARDIOGRAM COMPLETE W/O CONT; Future    6. Mixed hyperlipidemia    7. Prediabetes    8. Healthcare maintenance  - HEMOGLOBIN A1C; Future  - CBC WITH DIFFERENTIAL; Future  - Comp Metabolic Panel; Future  - TSH WITH REFLEX TO FT4; Future  - Lipid Profile; Future  - VITAMIN D,25 HYDROXY (DEFICIENCY); Future      Health maintenance:    Labs per orders  Immunizations per orders  Age-appropriate guidance discussed including diet and exercise  Discussed  diet and exercise     Follow-up: 1 year annual

## 2025-02-03 ENCOUNTER — PATIENT MESSAGE (OUTPATIENT)
Dept: HEALTH INFORMATION MANAGEMENT | Facility: OTHER | Age: 65
End: 2025-02-03

## 2025-04-10 ENCOUNTER — OFFICE VISIT (OUTPATIENT)
Dept: MEDICAL GROUP | Facility: MEDICAL CENTER | Age: 65
End: 2025-04-10
Payer: COMMERCIAL

## 2025-04-10 VITALS
DIASTOLIC BLOOD PRESSURE: 72 MMHG | OXYGEN SATURATION: 97 % | HEIGHT: 67 IN | BODY MASS INDEX: 25.68 KG/M2 | WEIGHT: 163.6 LBS | SYSTOLIC BLOOD PRESSURE: 118 MMHG | HEART RATE: 63 BPM | TEMPERATURE: 97.6 F

## 2025-04-10 DIAGNOSIS — R09.82 POST-NASAL DRIP: ICD-10-CM

## 2025-04-10 DIAGNOSIS — R05.2 SUBACUTE COUGH: ICD-10-CM

## 2025-04-10 PROCEDURE — 3078F DIAST BP <80 MM HG: CPT | Performed by: FAMILY MEDICINE

## 2025-04-10 PROCEDURE — 99213 OFFICE O/P EST LOW 20 MIN: CPT | Performed by: FAMILY MEDICINE

## 2025-04-10 PROCEDURE — 3074F SYST BP LT 130 MM HG: CPT | Performed by: FAMILY MEDICINE

## 2025-04-10 RX ORDER — METHYLPREDNISOLONE 4 MG/1
TABLET ORAL
Qty: 21 TABLET | Refills: 0 | Status: SHIPPED | OUTPATIENT
Start: 2025-04-10

## 2025-04-10 ASSESSMENT — FIBROSIS 4 INDEX: FIB4 SCORE: 0.82

## 2025-04-10 ASSESSMENT — PATIENT HEALTH QUESTIONNAIRE - PHQ9: CLINICAL INTERPRETATION OF PHQ2 SCORE: 0

## 2025-04-10 NOTE — PROGRESS NOTES
Verbal consent was acquired by the patient to use [employer] ambient listening note generation during this visit:  Yes      Chief complaint::Diagnoses of Subacute cough and Post-nasal drip were pertinent to this visit.    Assessment and Plan:   The following treatment plan was discussed:     Assessment & Plan  1. Subacute cough:   - Recommended Flonase or Nasacort for inflammation and mucus reduction.  - Suggested oral antihistamines (Claritin, Zyrtec, Allegra) to help dry up mucus.  - Prescribed Medrol Dosepak for inflammation.  - Advised to monitor for allergy symptoms.    Follow-up  - Follow up as needed.  Susy was seen today for follow-up.    Diagnoses and all orders for this visit:    Subacute cough  -     methylPREDNISolone (MEDROL DOSEPAK) 4 MG Tablet Therapy Pack; Use as directed    Post-nasal drip        Followup: Return if symptoms worsen or fail to improve.    Subjective/HPI:     HPI:    Susy Wheeler is a pleasant 64 y.o. female here for   Chief Complaint   Patient presents with    Follow-Up     Persistent cough, has had for 3 weeks, not been tested for any sickness        History of Present Illness  The patient is a 64-year-old individual with a 3-week history of a non-productive, intermittent cough.    Cough  - Non-productive, intermittent cough for 3 weeks  - Occasionally perceives phlegm but not enough to expectorate  - No rhinorrhea or nasal congestion  - No use of OTC medications  - Cough syrup ineffective    Supplemental information: History of heart murmur.    Current Medicines (including changes today)  Current Outpatient Medications   Medication Sig Dispense Refill    methylPREDNISolone (MEDROL DOSEPAK) 4 MG Tablet Therapy Pack Use as directed 21 Tablet 0    albuterol 108 (90 Base) MCG/ACT Aero Soln inhalation aerosol Inhale 2 Puffs every 6 hours as needed for Shortness of Breath. 8.5 g 1     No current facility-administered medications for this visit.     Past Medical/ Surgical History  She  " has a past medical history of Enlarged thyroid gland (4/5/2021), Hyperlipidemia, Prediabetes, and Urinary tract infection.  She has no past medical history of Asthma, Hypertension, or Kidney disease.  She  has a past surgical history that includes gyn surgery (2001).       Objective:   /72   Pulse 63   Temp 36.4 °C (97.6 °F) (Temporal)   Ht 1.702 m (5' 7\")   Wt 74.2 kg (163 lb 9.6 oz)   SpO2 97%  Body mass index is 25.62 kg/m².    Physical Exam  Constitutional:       General: She is not in acute distress.     Appearance: She is not ill-appearing or toxic-appearing.   HENT:      Head: Normocephalic.      Right Ear: Tympanic membrane and external ear normal.      Left Ear: Tympanic membrane and external ear normal.      Nose: No rhinorrhea.      Right Turbinates: Swollen.      Left Turbinates: Swollen.      Mouth/Throat:      Mouth: Mucous membranes are moist.      Pharynx: Pharyngeal swelling, posterior oropharyngeal erythema and postnasal drip present.   Eyes:      General:         Right eye: No discharge.         Left eye: No discharge.      Conjunctiva/sclera: Conjunctivae normal.      Pupils: Pupils are equal, round, and reactive to light.   Cardiovascular:      Rate and Rhythm: Normal rate and regular rhythm.      Heart sounds: No murmur heard.  Pulmonary:      Effort: Pulmonary effort is normal. No respiratory distress.      Breath sounds: Normal breath sounds. No wheezing.   Abdominal:      General: Abdomen is flat.   Musculoskeletal:         General: No swelling or tenderness.      Cervical back: Normal range of motion and neck supple.      Right lower leg: No edema.      Left lower leg: No edema.   Lymphadenopathy:      Cervical: No cervical adenopathy.   Skin:     General: Skin is warm.   Neurological:      General: No focal deficit present.      Mental Status: She is alert and oriented to person, place, and time. Mental status is at baseline.   Psychiatric:         Mood and Affect: Mood normal. "          Lab/ Imaging Results:  No labs or imaging to review    Please note that this dictation was created using voice recognition software. I have made every reasonable attempt to correct obvious errors, but I expect that there are errors of grammar and possibly content that I did not discover before finalizing the note.

## 2025-04-30 ENCOUNTER — HOSPITAL ENCOUNTER (OUTPATIENT)
Dept: RADIOLOGY | Facility: MEDICAL CENTER | Age: 65
End: 2025-04-30
Attending: STUDENT IN AN ORGANIZED HEALTH CARE EDUCATION/TRAINING PROGRAM
Payer: COMMERCIAL

## 2025-04-30 DIAGNOSIS — E04.1 RIGHT THYROID NODULE: ICD-10-CM

## 2025-04-30 DIAGNOSIS — E06.3 CHRONIC LYMPHOCYTIC THYROIDITIS: ICD-10-CM

## 2025-04-30 PROCEDURE — 76536 US EXAM OF HEAD AND NECK: CPT

## 2025-05-01 ENCOUNTER — RESULTS FOLLOW-UP (OUTPATIENT)
Dept: MEDICAL GROUP | Facility: LAB | Age: 65
End: 2025-05-01

## 2025-05-07 ENCOUNTER — HOSPITAL ENCOUNTER (OUTPATIENT)
Dept: RADIOLOGY | Facility: MEDICAL CENTER | Age: 65
End: 2025-05-07
Attending: STUDENT IN AN ORGANIZED HEALTH CARE EDUCATION/TRAINING PROGRAM
Payer: COMMERCIAL

## 2025-05-07 DIAGNOSIS — R10.11 RUQ PAIN: ICD-10-CM

## 2025-05-07 DIAGNOSIS — Z12.31 ENCOUNTER FOR SCREENING MAMMOGRAM FOR MALIGNANT NEOPLASM OF BREAST: ICD-10-CM

## 2025-05-07 PROCEDURE — 77067 SCR MAMMO BI INCL CAD: CPT

## 2025-05-07 PROCEDURE — 74176 CT ABD & PELVIS W/O CONTRAST: CPT

## 2025-05-08 ENCOUNTER — RESULTS FOLLOW-UP (OUTPATIENT)
Dept: MEDICAL GROUP | Facility: LAB | Age: 65
End: 2025-05-08

## 2025-05-12 ENCOUNTER — RESULTS FOLLOW-UP (OUTPATIENT)
Dept: MEDICAL GROUP | Facility: LAB | Age: 65
End: 2025-05-12

## 2025-05-24 ENCOUNTER — APPOINTMENT (OUTPATIENT)
Dept: RADIOLOGY | Facility: MEDICAL CENTER | Age: 65
End: 2025-05-24
Attending: EMERGENCY MEDICINE
Payer: COMMERCIAL

## 2025-05-24 ENCOUNTER — APPOINTMENT (OUTPATIENT)
Dept: RADIOLOGY | Facility: MEDICAL CENTER | Age: 65
End: 2025-05-24
Attending: HOSPITALIST
Payer: COMMERCIAL

## 2025-05-24 ENCOUNTER — HOSPITAL ENCOUNTER (OUTPATIENT)
Facility: MEDICAL CENTER | Age: 65
End: 2025-05-25
Attending: EMERGENCY MEDICINE | Admitting: HOSPITALIST
Payer: COMMERCIAL

## 2025-05-24 DIAGNOSIS — R42 DIZZINESS: Primary | ICD-10-CM

## 2025-05-24 DIAGNOSIS — G45.9 TIA (TRANSIENT ISCHEMIC ATTACK): ICD-10-CM

## 2025-05-24 PROBLEM — R94.31 ABNORMAL EKG: Status: ACTIVE | Noted: 2025-05-24

## 2025-05-24 PROBLEM — R27.0 ATAXIA: Status: ACTIVE | Noted: 2025-05-24

## 2025-05-24 LAB
ABO + RH BLD: NORMAL
ABO GROUP BLD: NORMAL
ALBUMIN SERPL BCP-MCNC: 4.5 G/DL (ref 3.2–4.9)
ALBUMIN/GLOB SERPL: 1.4 G/DL
ALP SERPL-CCNC: 93 U/L (ref 30–99)
ALT SERPL-CCNC: 36 U/L (ref 2–50)
ANION GAP SERPL CALC-SCNC: 16 MMOL/L (ref 7–16)
APTT PPP: 30.2 SEC (ref 24.7–36)
AST SERPL-CCNC: 30 U/L (ref 12–45)
BASOPHILS # BLD AUTO: 1.1 % (ref 0–1.8)
BASOPHILS # BLD: 0.1 K/UL (ref 0–0.12)
BILIRUB SERPL-MCNC: 1.1 MG/DL (ref 0.1–1.5)
BLD GP AB SCN SERPL QL: NORMAL
BUN SERPL-MCNC: 17 MG/DL (ref 8–22)
CALCIUM ALBUM COR SERPL-MCNC: 9.4 MG/DL (ref 8.5–10.5)
CALCIUM SERPL-MCNC: 9.8 MG/DL (ref 8.5–10.5)
CHLORIDE SERPL-SCNC: 103 MMOL/L (ref 96–112)
CO2 SERPL-SCNC: 21 MMOL/L (ref 20–33)
CREAT SERPL-MCNC: 1.01 MG/DL (ref 0.5–1.4)
EKG IMPRESSION: NORMAL
EOSINOPHIL # BLD AUTO: 0.12 K/UL (ref 0–0.51)
EOSINOPHIL NFR BLD: 1.3 % (ref 0–6.9)
ERYTHROCYTE [DISTWIDTH] IN BLOOD BY AUTOMATED COUNT: 44.2 FL (ref 35.9–50)
GFR SERPLBLD CREATININE-BSD FMLA CKD-EPI: 62 ML/MIN/1.73 M 2
GLOBULIN SER CALC-MCNC: 3.3 G/DL (ref 1.9–3.5)
GLUCOSE BLD STRIP.AUTO-MCNC: 93 MG/DL (ref 65–99)
GLUCOSE SERPL-MCNC: 85 MG/DL (ref 65–99)
HCT VFR BLD AUTO: 44.9 % (ref 37–47)
HGB BLD-MCNC: 14.9 G/DL (ref 12–16)
IMM GRANULOCYTES # BLD AUTO: 0.02 K/UL (ref 0–0.11)
IMM GRANULOCYTES NFR BLD AUTO: 0.2 % (ref 0–0.9)
INR PPP: 1.02 (ref 0.87–1.13)
LYMPHOCYTES # BLD AUTO: 2.46 K/UL (ref 1–4.8)
LYMPHOCYTES NFR BLD: 27.2 % (ref 22–41)
MCH RBC QN AUTO: 30.5 PG (ref 27–33)
MCHC RBC AUTO-ENTMCNC: 33.2 G/DL (ref 32.2–35.5)
MCV RBC AUTO: 91.8 FL (ref 81.4–97.8)
MONOCYTES # BLD AUTO: 0.67 K/UL (ref 0–0.85)
MONOCYTES NFR BLD AUTO: 7.4 % (ref 0–13.4)
NEUTROPHILS # BLD AUTO: 5.68 K/UL (ref 1.82–7.42)
NEUTROPHILS NFR BLD: 62.8 % (ref 44–72)
NRBC # BLD AUTO: 0 K/UL
NRBC BLD-RTO: 0 /100 WBC (ref 0–0.2)
PLATELET # BLD AUTO: 333 K/UL (ref 164–446)
PMV BLD AUTO: 11.2 FL (ref 9–12.9)
POTASSIUM SERPL-SCNC: 3.8 MMOL/L (ref 3.6–5.5)
PROT SERPL-MCNC: 7.8 G/DL (ref 6–8.2)
PROTHROMBIN TIME: 13.7 SEC (ref 12–14.6)
RBC # BLD AUTO: 4.89 M/UL (ref 4.2–5.4)
RH BLD: NORMAL
SODIUM SERPL-SCNC: 140 MMOL/L (ref 135–145)
TROPONIN T SERPL-MCNC: <6 NG/L (ref 6–19)
WBC # BLD AUTO: 9.1 K/UL (ref 4.8–10.8)

## 2025-05-24 PROCEDURE — 86901 BLOOD TYPING SEROLOGIC RH(D): CPT

## 2025-05-24 PROCEDURE — 86850 RBC ANTIBODY SCREEN: CPT

## 2025-05-24 PROCEDURE — 36415 COLL VENOUS BLD VENIPUNCTURE: CPT

## 2025-05-24 PROCEDURE — 85610 PROTHROMBIN TIME: CPT

## 2025-05-24 PROCEDURE — 86900 BLOOD TYPING SEROLOGIC ABO: CPT

## 2025-05-24 PROCEDURE — G0378 HOSPITAL OBSERVATION PER HR: HCPCS

## 2025-05-24 PROCEDURE — 85730 THROMBOPLASTIN TIME PARTIAL: CPT

## 2025-05-24 PROCEDURE — 85025 COMPLETE CBC W/AUTO DIFF WBC: CPT

## 2025-05-24 PROCEDURE — 84484 ASSAY OF TROPONIN QUANT: CPT

## 2025-05-24 PROCEDURE — 70450 CT HEAD/BRAIN W/O DYE: CPT

## 2025-05-24 PROCEDURE — 93005 ELECTROCARDIOGRAM TRACING: CPT | Mod: TC | Performed by: EMERGENCY MEDICINE

## 2025-05-24 PROCEDURE — 80053 COMPREHEN METABOLIC PANEL: CPT

## 2025-05-24 PROCEDURE — 0042T CT-CEREBRAL PERFUSION ANALYSIS: CPT

## 2025-05-24 PROCEDURE — 70496 CT ANGIOGRAPHY HEAD: CPT

## 2025-05-24 PROCEDURE — 82962 GLUCOSE BLOOD TEST: CPT

## 2025-05-24 PROCEDURE — 94760 N-INVAS EAR/PLS OXIMETRY 1: CPT

## 2025-05-24 PROCEDURE — 70498 CT ANGIOGRAPHY NECK: CPT

## 2025-05-24 PROCEDURE — 71045 X-RAY EXAM CHEST 1 VIEW: CPT

## 2025-05-24 PROCEDURE — 70551 MRI BRAIN STEM W/O DYE: CPT

## 2025-05-24 PROCEDURE — 99223 1ST HOSP IP/OBS HIGH 75: CPT | Performed by: HOSPITALIST

## 2025-05-24 PROCEDURE — 99285 EMERGENCY DEPT VISIT HI MDM: CPT

## 2025-05-24 PROCEDURE — 700117 HCHG RX CONTRAST REV CODE 255: Performed by: EMERGENCY MEDICINE

## 2025-05-24 RX ORDER — ONDANSETRON 2 MG/ML
4 INJECTION INTRAMUSCULAR; INTRAVENOUS EVERY 4 HOURS PRN
Status: DISCONTINUED | OUTPATIENT
Start: 2025-05-24 | End: 2025-05-25 | Stop reason: HOSPADM

## 2025-05-24 RX ORDER — M-VIT,TX,IRON,MINS/CALC/FOLIC 27MG-0.4MG
1 TABLET ORAL DAILY
COMMUNITY

## 2025-05-24 RX ORDER — PROMETHAZINE HYDROCHLORIDE 25 MG/1
12.5-25 SUPPOSITORY RECTAL EVERY 4 HOURS PRN
Status: DISCONTINUED | OUTPATIENT
Start: 2025-05-24 | End: 2025-05-25 | Stop reason: HOSPADM

## 2025-05-24 RX ORDER — ONDANSETRON 4 MG/1
4 TABLET, ORALLY DISINTEGRATING ORAL EVERY 4 HOURS PRN
Status: DISCONTINUED | OUTPATIENT
Start: 2025-05-24 | End: 2025-05-25 | Stop reason: HOSPADM

## 2025-05-24 RX ORDER — ASPIRIN 81 MG/1
81 TABLET, CHEWABLE ORAL DAILY
Status: DISCONTINUED | OUTPATIENT
Start: 2025-05-25 | End: 2025-05-24

## 2025-05-24 RX ORDER — PROCHLORPERAZINE EDISYLATE 5 MG/ML
5-10 INJECTION INTRAMUSCULAR; INTRAVENOUS EVERY 4 HOURS PRN
Status: DISCONTINUED | OUTPATIENT
Start: 2025-05-24 | End: 2025-05-25 | Stop reason: HOSPADM

## 2025-05-24 RX ORDER — POLYETHYLENE GLYCOL 3350 17 G/17G
1 POWDER, FOR SOLUTION ORAL
Status: DISCONTINUED | OUTPATIENT
Start: 2025-05-24 | End: 2025-05-25 | Stop reason: HOSPADM

## 2025-05-24 RX ORDER — ASPIRIN 300 MG/1
300 SUPPOSITORY RECTAL DAILY
Status: DISCONTINUED | OUTPATIENT
Start: 2025-05-25 | End: 2025-05-24

## 2025-05-24 RX ORDER — PROMETHAZINE HYDROCHLORIDE 25 MG/1
12.5-25 TABLET ORAL EVERY 4 HOURS PRN
Status: DISCONTINUED | OUTPATIENT
Start: 2025-05-24 | End: 2025-05-25 | Stop reason: HOSPADM

## 2025-05-24 RX ORDER — M-VIT,TX,IRON,MINS/CALC/FOLIC 27MG-0.4MG
1 TABLET ORAL DAILY
Status: DISCONTINUED | OUTPATIENT
Start: 2025-05-25 | End: 2025-05-25 | Stop reason: HOSPADM

## 2025-05-24 RX ORDER — AMOXICILLIN 250 MG
2 CAPSULE ORAL EVERY EVENING
Status: DISCONTINUED | OUTPATIENT
Start: 2025-05-24 | End: 2025-05-25 | Stop reason: HOSPADM

## 2025-05-24 RX ORDER — ACETAMINOPHEN 325 MG/1
650 TABLET ORAL EVERY 6 HOURS PRN
Status: DISCONTINUED | OUTPATIENT
Start: 2025-05-24 | End: 2025-05-25 | Stop reason: HOSPADM

## 2025-05-24 RX ADMIN — IOHEXOL 120 ML: 350 INJECTION, SOLUTION INTRAVENOUS at 18:00

## 2025-05-24 ASSESSMENT — LIFESTYLE VARIABLES
TOTAL SCORE: 0
AVERAGE NUMBER OF DAYS PER WEEK YOU HAVE A DRINK CONTAINING ALCOHOL: 0
HAVE PEOPLE ANNOYED YOU BY CRITICIZING YOUR DRINKING: NO
ON A TYPICAL DAY WHEN YOU DRINK ALCOHOL HOW MANY DRINKS DO YOU HAVE: 0
CONSUMPTION TOTAL: NEGATIVE
TOTAL SCORE: 0
DOES PATIENT WANT TO STOP DRINKING: NO
EVER FELT BAD OR GUILTY ABOUT YOUR DRINKING: NO
ALCOHOL_USE: NO
HAVE YOU EVER FELT YOU SHOULD CUT DOWN ON YOUR DRINKING: NO
HOW MANY TIMES IN THE PAST YEAR HAVE YOU HAD 5 OR MORE DRINKS IN A DAY: 0
EVER HAD A DRINK FIRST THING IN THE MORNING TO STEADY YOUR NERVES TO GET RID OF A HANGOVER: NO
TOTAL SCORE: 0

## 2025-05-24 ASSESSMENT — COGNITIVE AND FUNCTIONAL STATUS - GENERAL
CLIMB 3 TO 5 STEPS WITH RAILING: A LITTLE
MOBILITY SCORE: 22
DAILY ACTIVITIY SCORE: 24
SUGGESTED CMS G CODE MODIFIER DAILY ACTIVITY: CH
WALKING IN HOSPITAL ROOM: A LITTLE
SUGGESTED CMS G CODE MODIFIER MOBILITY: CJ

## 2025-05-24 ASSESSMENT — FIBROSIS 4 INDEX
FIB4 SCORE: 0.96
FIB4 SCORE: 0.96
FIB4 SCORE: 0.82

## 2025-05-24 ASSESSMENT — ENCOUNTER SYMPTOMS
MYALGIAS: 0
DIZZINESS: 1
NERVOUS/ANXIOUS: 0
VOMITING: 0
ABDOMINAL PAIN: 0
EYE DISCHARGE: 0
BRUISES/BLEEDS EASILY: 0
FEVER: 0
EYE REDNESS: 0
STRIDOR: 0
SHORTNESS OF BREATH: 0
COUGH: 0
CHILLS: 0
FLANK PAIN: 0
FOCAL WEAKNESS: 0

## 2025-05-24 ASSESSMENT — HEART SCORE
RISK FACTORS: 1-2 RISK FACTORS
ECG: NON-SPECIFIC REPOLARIZATION DISTURBANCE
HEART SCORE: 3
HISTORY: SLIGHTLY SUSPICIOUS
TROPONIN: LESS THAN OR EQUAL TO NORMAL LIMIT
AGE: 45-64

## 2025-05-24 ASSESSMENT — PATIENT HEALTH QUESTIONNAIRE - PHQ9
1. LITTLE INTEREST OR PLEASURE IN DOING THINGS: NOT AT ALL
2. FEELING DOWN, DEPRESSED, IRRITABLE, OR HOPELESS: NOT AT ALL
SUM OF ALL RESPONSES TO PHQ9 QUESTIONS 1 AND 2: 0

## 2025-05-24 ASSESSMENT — SOCIAL DETERMINANTS OF HEALTH (SDOH)

## 2025-05-24 ASSESSMENT — PAIN DESCRIPTION - PAIN TYPE: TYPE: ACUTE PAIN

## 2025-05-25 ENCOUNTER — APPOINTMENT (OUTPATIENT)
Dept: CARDIOLOGY | Facility: MEDICAL CENTER | Age: 65
End: 2025-05-25
Attending: HOSPITALIST
Payer: COMMERCIAL

## 2025-05-25 VITALS
OXYGEN SATURATION: 95 % | DIASTOLIC BLOOD PRESSURE: 69 MMHG | BODY MASS INDEX: 25.33 KG/M2 | WEIGHT: 161.38 LBS | HEART RATE: 67 BPM | RESPIRATION RATE: 17 BRPM | HEIGHT: 67 IN | TEMPERATURE: 98.6 F | SYSTOLIC BLOOD PRESSURE: 111 MMHG

## 2025-05-25 LAB
ALBUMIN SERPL BCP-MCNC: 4 G/DL (ref 3.2–4.9)
ALBUMIN/GLOB SERPL: 1.5 G/DL
ALP SERPL-CCNC: 82 U/L (ref 30–99)
ALT SERPL-CCNC: 29 U/L (ref 2–50)
ANION GAP SERPL CALC-SCNC: 11 MMOL/L (ref 7–16)
AST SERPL-CCNC: 22 U/L (ref 12–45)
BILIRUB SERPL-MCNC: 0.9 MG/DL (ref 0.1–1.5)
BUN SERPL-MCNC: 16 MG/DL (ref 8–22)
CALCIUM ALBUM COR SERPL-MCNC: 9.2 MG/DL (ref 8.5–10.5)
CALCIUM SERPL-MCNC: 9.2 MG/DL (ref 8.5–10.5)
CHLORIDE SERPL-SCNC: 103 MMOL/L (ref 96–112)
CHOLEST SERPL-MCNC: 204 MG/DL (ref 100–199)
CO2 SERPL-SCNC: 24 MMOL/L (ref 20–33)
CREAT SERPL-MCNC: 0.89 MG/DL (ref 0.5–1.4)
ERYTHROCYTE [DISTWIDTH] IN BLOOD BY AUTOMATED COUNT: 45 FL (ref 35.9–50)
EST. AVERAGE GLUCOSE BLD GHB EST-MCNC: 117 MG/DL
GFR SERPLBLD CREATININE-BSD FMLA CKD-EPI: 72 ML/MIN/1.73 M 2
GLOBULIN SER CALC-MCNC: 2.7 G/DL (ref 1.9–3.5)
GLUCOSE SERPL-MCNC: 105 MG/DL (ref 65–99)
HBA1C MFR BLD: 5.7 % (ref 4–5.6)
HCT VFR BLD AUTO: 42.2 % (ref 37–47)
HDLC SERPL-MCNC: 54 MG/DL
HGB BLD-MCNC: 14 G/DL (ref 12–16)
LDLC SERPL CALC-MCNC: 127 MG/DL
LV EJECT FRACT  99904: 60
LV EJECT FRACT MOD 2C 99903: 59.49
LV EJECT FRACT MOD 4C 99902: 60.06
LV EJECT FRACT MOD BP 99901: 61.43
MAGNESIUM SERPL-MCNC: 2.1 MG/DL (ref 1.5–2.5)
MCH RBC QN AUTO: 30.7 PG (ref 27–33)
MCHC RBC AUTO-ENTMCNC: 33.2 G/DL (ref 32.2–35.5)
MCV RBC AUTO: 92.5 FL (ref 81.4–97.8)
PLATELET # BLD AUTO: 308 K/UL (ref 164–446)
PMV BLD AUTO: 10.9 FL (ref 9–12.9)
POTASSIUM SERPL-SCNC: 3.8 MMOL/L (ref 3.6–5.5)
PROT SERPL-MCNC: 6.7 G/DL (ref 6–8.2)
RBC # BLD AUTO: 4.56 M/UL (ref 4.2–5.4)
SODIUM SERPL-SCNC: 138 MMOL/L (ref 135–145)
TRIGL SERPL-MCNC: 117 MG/DL (ref 0–149)
TSH SERPL DL<=0.005 MIU/L-ACNC: 1.6 UIU/ML (ref 0.38–5.33)
WBC # BLD AUTO: 7.9 K/UL (ref 4.8–10.8)

## 2025-05-25 PROCEDURE — 700102 HCHG RX REV CODE 250 W/ 637 OVERRIDE(OP): Performed by: HOSPITALIST

## 2025-05-25 PROCEDURE — A9270 NON-COVERED ITEM OR SERVICE: HCPCS | Performed by: HOSPITALIST

## 2025-05-25 PROCEDURE — 84443 ASSAY THYROID STIM HORMONE: CPT

## 2025-05-25 PROCEDURE — 80061 LIPID PANEL: CPT

## 2025-05-25 PROCEDURE — 83036 HEMOGLOBIN GLYCOSYLATED A1C: CPT

## 2025-05-25 PROCEDURE — 93306 TTE W/DOPPLER COMPLETE: CPT | Mod: 26 | Performed by: INTERNAL MEDICINE

## 2025-05-25 PROCEDURE — 36415 COLL VENOUS BLD VENIPUNCTURE: CPT

## 2025-05-25 PROCEDURE — 85027 COMPLETE CBC AUTOMATED: CPT

## 2025-05-25 PROCEDURE — 83735 ASSAY OF MAGNESIUM: CPT

## 2025-05-25 PROCEDURE — 93306 TTE W/DOPPLER COMPLETE: CPT

## 2025-05-25 PROCEDURE — 80053 COMPREHEN METABOLIC PANEL: CPT

## 2025-05-25 PROCEDURE — G0378 HOSPITAL OBSERVATION PER HR: HCPCS

## 2025-05-25 PROCEDURE — 99239 HOSP IP/OBS DSCHRG MGMT >30: CPT | Performed by: INTERNAL MEDICINE

## 2025-05-25 RX ADMIN — Medication 1 TABLET: at 05:40

## 2025-05-25 ASSESSMENT — PAIN DESCRIPTION - PAIN TYPE: TYPE: ACUTE PAIN

## 2025-05-25 ASSESSMENT — FIBROSIS 4 INDEX: FIB4 SCORE: 1.04

## 2025-05-25 NOTE — PROGRESS NOTES
Telemetry Shift Summary     Rhythm: SR  Rate: 62-76  Measurements: 0.15/0.09/0.41  Ectopy (reported by Monitor Tech): r-fPAC, rPVC     Normal Values  Rhythm: Sinus  HR:   Measurements: 0.12-0.20/0.06-0.10/0.30-0.52

## 2025-05-25 NOTE — DISCHARGE INSTRUCTIONS
Instructions for home monitoring of blood pressure:    ** Please obtain an ARM blood pressure machine, if you do not have one. Please obtain machine that can also track heart rate, if possible.  Please DO NOT use wrist type machine.    1) Please continue to take your medications as directed  2)  If you have symptoms of dizziness or nausea or falling or syncope or blurry vision, please check your blood pressure. IF your blood pressure remains low (Systolic or top number less than 100mmHg) please hold your medication and speak with your primary care provider as soon as possible.  Please seek help at an emergency room if your blood pressure does not improve at home.  3)  Please adhere to a ADAMS diet (limited sodium diet, 2 grams per day, no added salt to food) or diet recommended for cardiac, renal, low fat or diabetic diet you were on before.    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  2)  Please take blood pressure reading when you wake up, around lunch time and before sleeping.  3)  Please bring blood pressure diary to your primary care provider or cardiologist for blood pressure medication management.

## 2025-05-25 NOTE — CARE PLAN
The patient is Stable - Low risk of patient condition declining or worsening    Shift Goals  Clinical Goals: monitor neuro status  Patient Goals: rest  Family Goals: updates    Progress made toward(s) clinical / shift goals:    Problem: Knowledge Deficit - Stroke Education  Goal: Patient's knowledge of stroke and risk factors will improve  Description: Target End Date:  1-3 days or as soon as patient condition allowsDocument in Patient Education1.  Stroke education booklet provided2.  Education regarding EMS activation, need for follow up, medication prescribed at discharge, risk factors for stroke/lifestyle modifications, warning signs and symptoms of stroke provided  Outcome: Progressing     Problem: Neuro Status  Goal: Neuro status will remain stable or improve  Description: Target End Date:  Prior to discharge or change in level of careDocument on Neuro assessment in the Assessment flowsheet1.  Assess and monitor neurologic status per provider order/protocol/unit policy2.  Assess level of consciousness and orientation3.  Assess for speech, dysarthria, dysphagia, facial symmetry4.  Assess visual field, eye movements, gaze preference, pupil reaction and size5.  Assess muscle strength and motor response in all four extremities6.  Assess for sensation (numbness and tingling)7.  Assess basic neuro reflexes (cough, gag, corneal)8.  Identify changes in neuro status and report to provider for testing/treatment orders  Outcome: Progressing     Problem: Mobility - Stroke  Goal: Patient's capacity to carry out activities will improve  Description: Target End Date:  Prior to discharge or change in level of care1.  Assess for barriers to mobility/activity2.  Implement activity per interdisciplinary team recommendations3.  Target activity level identified and patient/family/caregiver aware of goal4.  Provide assistive devices5.  Instruct patient/caregiver on proper use of assistive/adaptive devices6.  Schedule activities and  rest periods to decrease effects of fatigue7.  Encourage mobilization to extent of ability8.  Maintain proper body alignment9.  Provide adequate pain management to allow progressive [id number]. Implement pace maker precautions as needed  Outcome: Progressing       Patient is not progressing towards the following goals:

## 2025-05-25 NOTE — ED NOTES
Medication history reviewed with patient at bedside. Med rec is complete     Allergies reviewed    Patient denied any outpatient antibiotics or anticoagulation in the last 30 days.    Ok per patient to discuss medications with visitor present         Dispense history available in EPIC at the time of the medication history? No        Amanda Cardenas Pharm.D., [facility]  ER Clinical Pharmacist

## 2025-05-25 NOTE — PROGRESS NOTES
Report received. Assumed care of pt at this time. Pt resting comfortably in bed, call light within reach, bed alarm on. No further needs at this time.

## 2025-05-25 NOTE — PROGRESS NOTES
4 Eyes Skin Assessment Completed by KATHARINE ruiz and KATHARINE george.    Skin assessment is primarily focused on high risk bony prominences. Pay special attention to skin beneath and around medical devices, high risk bony prominences, skin to skin areas and areas where the patient lacks sensation to feel pain and areas where the patient previously had breakdown.     Head (Occipital):  WDL   Ears (Under Medical Devices): WDL   Nose (Under Medical Devices): WDL   Mouth:  WDL   Neck: WDL   Breast/Chest:  WDL   Shoulder Blades:  WDL   Spine:   WDL   (R) Arm/Elbow/Hand: WDL   (L) Arm/Elbow/Hand: WDL   Abdomen: WDL   Pannus/Groin:  WDL   Sacrum/Coccyx:   WDL   (R) Ischial Tuberosity (Sit Bones):  WDL   (L) Ischial Tuberosity (Sit Bones):  WDL   (R) Leg:  WDL   (L) Leg:  WDL   (R) Heel:  Dry   (R) Foot/Toe: Dry   (L) Heel: Dry   (L) Foot/Toe:  Dry       DEVICES IN USE:   Respiratory Devices:  NA, patient on room air  Feeding Devices:  N/A   Lines & BP Monitoring Devices:  Peripheral IV, BP cuff    Orthopedic Devices:  N/A  Miscellaneous Devices:  Telemetry monitor    PROTOCOL INTERVENTIONS:   Standard/Trauma Bed:  Already in place    WOUND PHOTOS:   N/A no wounds identified    WOUND CONSULT:   N/A, no advanced wound care needs identified

## 2025-05-25 NOTE — ASSESSMENT & PLAN NOTE
EKG shows sinus rhythm with a rate of 70.  There is no ST elevation.  There are T wave inversions in lead III.  QTc is 485.    I will place on continuous cardiac monitoring  I will check and trend troponins  I will check echocardiography

## 2025-05-25 NOTE — ED TRIAGE NOTES
"Chief Complaint   Patient presents with    Lightheadedness     Pt walked back to triage with unsteady gate, appears dizzy. She states that today she was sitting in the car at 1200 and had a sudden onset of lightheadedness that has gradually worsened since onset. No n/v/d, blurry vision, no unilateral weakness or facial droop. Pt A&Ox4 and answering questions appropriately.   Pt placed in wheelchair and brought back to room immediately. Code stroke called.          No head injury or recent fall. No HA. No blood thinners.     BP (!) 138/94   Pulse 76   Temp 36.8 °C (98.2 °F) (Temporal)   Resp 18   Ht 1.702 m (5' 7\")   Wt 73.2 kg (161 lb 6 oz)   SpO2 94%   BMI 25.28 kg/m²     "

## 2025-05-25 NOTE — ED NOTES
Went to take pt to room 315, but MRI called to take pt this evening, pt brought back to room and screen complete. Pt going to MR right now.

## 2025-05-25 NOTE — H&P
Hospital Medicine History & Physical Note    Date of Service  5/24/2025    Primary Care Physician  Holland Hunt D.O.    Consultants  None     Code Status  Full Code    Chief Complaint  Chief Complaint   Patient presents with    Lightheadedness     Pt walked back to triage with unsteady gate, appears dizzy. She states that today she was sitting in the car at 1200 and had a sudden onset of lightheadedness that has gradually worsened since onset. No n/v/d, blurry vision, no unilateral weakness or facial droop. Pt A&Ox4 and answering questions appropriately.   Pt placed in wheelchair and brought back to room immediately. Code stroke called.      History of Presenting Illness  Susy Wheeler is a 64 y.o. female who presented 5/24/2025 with dizziness, lightheadedness and difficulty maintaining balance with walking.  Symptoms started suddenly on the day of admission.  She denies having blurry vision, nausea, vomiting or focal motor weakness.  She did not have loss of consciousness    I discussed the plan of care with emergency physician, the patient and patient family present at bedside in the emergency room    Review of Systems  Review of Systems   Constitutional:  Positive for malaise/fatigue. Negative for chills and fever.   Eyes:  Negative for discharge and redness.   Respiratory:  Negative for cough, shortness of breath and stridor.    Cardiovascular:  Negative for chest pain and leg swelling.   Gastrointestinal:  Negative for abdominal pain and vomiting.   Genitourinary:  Negative for flank pain.   Musculoskeletal:  Negative for myalgias.   Skin: Negative.    Neurological:  Positive for dizziness. Negative for focal weakness.        Difficulty maintaining balance with ambulation  Lightheadedness   Endo/Heme/Allergies:  Does not bruise/bleed easily.   Psychiatric/Behavioral:  The patient is not nervous/anxious.      Past Medical History   has a past medical history of Enlarged thyroid gland (4/5/2021),  Hyperlipidemia, Prediabetes, and Urinary tract infection.    Surgical History   has a past surgical history that includes gyn surgery (2001).     Family History  family history includes Anxiety disorder in her brother; Arthritis in her brother; COPD in her brother and mother; Cancer in her maternal grandmother, paternal aunt, paternal aunt, and sister; Heart Disease in her maternal grandfather; Hypertension in her brother, brother, father, sister, and sister; Stroke in her father.      Social History   reports that she quit smoking about 39 years ago. She started smoking about 43 years ago. She has a 1 pack-year smoking history. She has never used smokeless tobacco. She reports current alcohol use. She reports that she does not currently use drugs.    Allergies  Allergies[1]    Medications  Prior to Admission Medications   Prescriptions Last Dose Informant Patient Reported? Taking?   VITAMIN D PO Unknown  Yes No   Sig: Take 1 Tablet by mouth every day.   albuterol 108 (90 Base) MCG/ACT Aero Soln inhalation aerosol Not Taking  No No   Sig: Inhale 2 Puffs every 6 hours as needed for Shortness of Breath.   Patient not taking: Reported on 5/24/2025   therapeutic multivitamin-minerals (THERAGRAN-M) Tab Unknown  Yes No   Sig: Take 1 Tablet by mouth every day.      Facility-Administered Medications: None     Physical Exam  Temp:  [36.8 °C (98.2 °F)] 36.8 °C (98.2 °F)  Pulse:  [62-76] 62  Resp:  [18-20] 20  BP: (138-142)/(68-94) 142/68  SpO2:  [94 %-95 %] 95 %  Blood Pressure: (!) 138/94   Temperature: 36.8 °C (98.2 °F)   Pulse: 76   Respiration: 18   Pulse Oximetry: 94 %     Physical Exam  Constitutional:       General: She is not in acute distress.  HENT:      Head: Normocephalic and atraumatic.      Right Ear: External ear normal.      Left Ear: External ear normal.      Nose: No congestion or rhinorrhea.      Mouth/Throat:      Mouth: Mucous membranes are moist.      Pharynx: No oropharyngeal exudate or posterior  "oropharyngeal erythema.   Eyes:      General: No scleral icterus.        Right eye: No discharge.         Left eye: No discharge.      Conjunctiva/sclera: Conjunctivae normal.      Pupils: Pupils are equal, round, and reactive to light.   Cardiovascular:      Heart sounds:      No friction rub. No gallop.   Pulmonary:      Effort: Pulmonary effort is normal.   Abdominal:      General: Abdomen is flat. There is no distension.      Tenderness: There is no guarding.   Musculoskeletal:         General: No swelling.      Cervical back: Neck supple. No rigidity. No muscular tenderness.      Right lower leg: No edema.      Left lower leg: No edema.   Skin:     Capillary Refill: Capillary refill takes 2 to 3 seconds.      Coloration: Skin is not jaundiced or pale.      Findings: No bruising or erythema.   Neurological:      Mental Status: She is alert and oriented to person, place, and time.      Comments: Fluent speech.  Power is 5/5 in both upper and lower extremities.  Ataxic gait   Psychiatric:         Mood and Affect: Mood normal.         Judgment: Judgment normal.       Laboratory:  Recent Labs     05/24/25  1715   WBC 9.1   RBC 4.89   HEMOGLOBIN 14.9   HEMATOCRIT 44.9   MCV 91.8   MCH 30.5   MCHC 33.2   RDW 44.2   PLATELETCT 333   MPV 11.2     Recent Labs     05/24/25  1715   SODIUM 140   POTASSIUM 3.8   CHLORIDE 103   CO2 21   GLUCOSE 85   BUN 17   CREATININE 1.01   CALCIUM 9.8     Recent Labs     05/24/25  1715   ALTSGPT 36   ASTSGOT 30   ALKPHOSPHAT 93   TBILIRUBIN 1.1   GLUCOSE 85     Recent Labs     05/24/25  1715   APTT 30.2   INR 1.02     No results for input(s): \"NTPROBNP\" in the last 72 hours.      Recent Labs     05/24/25  1715   TROPONINT <6     Imaging:  CT-CTA NECK WITH & W/O-POST PROCESSING   Final Result      No high-grade stenosis, large vessel occlusion, aneurysm or dissection.      CT-CEREBRAL PERFUSION ANALYSIS   Final Result      1. Cerebral blood flow less than 30% possibly representing completed " "infarct = 0 mL. Based on distribution of this finding, this is unlikely to represent artifact.      2. T Max more than 6 seconds possibly representing combination of completed infarct and ischemia = 0 mL. Based on the distribution of this finding, this is unlikely to represent artifact.      3. Mismatched volume possibly representing ischemic brain/penumbra= 0 mL      4.  Please note that this cerebral perfusion study and report is Quantitative and targets supratentorial (cerebral) perfusion for evaluation of large vessel territory acute ischemia/infarction. For example, lacunar infarcts, and brainstem/posterior fossa    ischemia/infarction are not evaluated on this study.  Data acquisition is subject to artifacts which can yield non-anatomically plausible perfusion maps which may be due to motion, bolus timing, signal to noise ratio, or other technical factors.    Perfusion map abnormalities which show non-anatomic distributions are likely artifact.   This study is not \"stand-alone\" and should only be utilized for diagnosis, management/treatment in correlation with CT, CTA, and/or MRI and clinical factors.         CT-CTA HEAD WITH & W/O-POST PROCESS   Final Result      1.  No large vessel occlusion, high-grade stenosis or aneurysm of the [place] of [place].   2.  No CT evidence of acute infarct, hemorrhage or mass.      DX-CHEST-PORTABLE (1 VIEW)   Final Result         1. No acute cardiopulmonary abnormalities are identified.      CT-HEAD W/O   Final Result         1. No acute intracranial abnormality. No evidence of acute intracranial hemorrhage or mass lesion.                           MR-BRAIN-W/O    (Results Pending)   EC-ECHOCARDIOGRAM COMPLETE W/O CONT    (Results Pending)     I personally reviewed patient EKG shows sinus rhythm with a rate of 70.  There is no ST elevation.  There are T wave inversions in lead III.  QTc is 485.     Assessment/Plan:  Justification for Admission Status  I anticipate this patient " is appropriate for observation status at this time because the patient has ataxia and will require stroke workup.    Patient will need a Telemetry bed on MEDICAL service.      * Ataxia- (present on admission)  Assessment & Plan  Admit to Neuro, with continuous cardiac monitoring  CT, CT-angiography, head, neck showed no acute infarction, or hemorrhage or significant stenosis  I will check Echocardiography to rule out shunting  I will check MRI brain  Aspiration, Fall, and seizure precautions     Neuro checks     Abnormal EKG- (present on admission)  Assessment & Plan  EKG shows sinus rhythm with a rate of 70.  There is no ST elevation.  There are T wave inversions in lead III.  QTc is 485.    I will place on continuous cardiac monitoring  I will check and trend troponins  I will check echocardiography       Prediabetes- (present on admission)  Assessment & Plan  Without significant hyperglycemia.    Monitor blood sugars with daily labs.  I will order a follow-up glucose level.  Consider sliding scale insulin according to blood sugar trend.     Mixed hyperlipidemia- (present on admission)  Assessment & Plan  Cardiac diet      VTE prophylaxis: SCDs/TEDs and Xarelto 10 mg daily as prophylaxis         [1] No Known Allergies

## 2025-05-25 NOTE — DISCHARGE SUMMARY
"Discharge Summary    CHIEF COMPLAINT ON ADMISSION  Chief Complaint   Patient presents with    Lightheadedness     Pt walked back to triage with unsteady gate, appears dizzy. She states that today she was sitting in the car at 1200 and had a sudden onset of lightheadedness that has gradually worsened since onset. No n/v/d, blurry vision, no unilateral weakness or facial droop. Pt A&Ox4 and answering questions appropriately.   Pt placed in wheelchair and brought back to room immediately. Code stroke called.        Reason for Admission  Dizziness,     Admission Date  5/24/2025    CODE STATUS  Full Code    HPI & HOSPITAL COURSE  This is a \"Susy Wheeler is a 64 y.o. female who presented 5/24/2025 with dizziness, lightheadedness and difficulty maintaining balance with walking.  Symptoms started suddenly on the day of admission.  She denies having blurry vision, nausea, vomiting or focal motor weakness.  She did not have loss of consciousness\" (as per admitting physician Dr. Wood in H&P).    After admission patient did improve on her condition.  She no longer had any dizziness.  We did a brain MRI which was negative for any acute stroke findings.  She does have chronic microvascular disease which could be indicating she has uncontrolled hypertension at times.    She does not use any antihypertensive medications at home.  Her BP was up to 142/68.  She states she has been trying to lose weight but has in the last few years gained weight.  Her BP on discharge was 111/69.  I did recommend for patient to take blood pressure 3 times a day, form a blood pressure diary and return to her primary care provider.  It is possible she has undiagnosed hypertension.  Patient preferred to change her lifestyle before starting any oral medications at this time.    On my examination, patient has a loud diastolic heart murmur 3/4, right 2nd intercostal loudest.  By exam, her murmur would indicate either mitral stenosis or aortic " regurgitation.  Interestingly her echocardiogram showed an LVEF of 60%, no mitral or aortic or tricuspid valvulopathy. No pericardial effusion.  Normal aortic root and ascending aorta was 3.6 cm.  She has trace pulmonic insufficiency.  She had an echocardiogram back on 10/18/2000 and [facility] which did show mild mitral regurgitation.  Unfortunately, her echocardiogram is inconsistent with her physical examination.    As patient had resolution of any of her symptoms, she was able to discharge home.  Again her brain MRI was negative, there was no acute infarction.    Therefore, she is discharged in good and stable condition to home with close outpatient follow-up.    The patient recovered much more quickly than anticipated on admission.    Discharge Date  5/25/2025    FOLLOW UP ITEMS POST DISCHARGE  With PCP    DISCHARGE DIAGNOSES  Principal Problem:    Ataxia (POA: Yes)  Active Problems:    Mixed hyperlipidemia (Chronic) (POA: Yes)    Prediabetes (Chronic) (POA: Yes)    Systolic murmur (POA: Yes)      Overview: Chronic, evaluated in the past. No chronic LE edema, or orthopnea, BLACK    Abnormal EKG (POA: Yes)  Resolved Problems:    * No resolved hospital problems. *      FOLLOW UP  Future Appointments   Date Time Provider Department Center   8/5/2025  1:15 PM [facility] 1 AFSHIN Vel[facility]quez   9/8/2025 11:20 AM Holland Hunt D.O. [facility] Anderson Sie     No follow-up provider specified.    MEDICATIONS ON DISCHARGE     Medication List        ASK your doctor about these medications        Instructions   albuterol 108 (90 Base) MCG/ACT Aers inhalation aerosol   Inhale 2 Puffs every 6 hours as needed for Shortness of Breath.  Dose: 2 Puff     therapeutic multivitamin-minerals Tabs   Take 1 Tablet by mouth every day.  Dose: 1 Tablet     VITAMIN D PO   Take 1 Tablet by mouth every day.  Dose: 1 Tablet              Allergies  Allergies[1]    DIET  Orders Placed This Encounter   Procedures    Diet Order Diet: Cardiac      Standing Status:   Standing     Number of Occurrences:   1     Diet::   Cardiac [6]       ACTIVITY  As tolerated.  Weight bearing as tolerated    CONSULTATIONS  None    PROCEDURES  None    LABORATORY  Lab Results   Component Value Date    SODIUM 138 05/25/2025    POTASSIUM 3.8 05/25/2025    CHLORIDE 103 05/25/2025    CO2 24 05/25/2025    GLUCOSE 105 (H) 05/25/2025    BUN 16 05/25/2025    CREATININE 0.89 05/25/2025        Lab Results   Component Value Date    WBC 7.9 05/25/2025    HEMOGLOBIN 14.0 05/25/2025    HEMATOCRIT 42.2 05/25/2025    PLATELETCT 308 05/25/2025        EC-ECHOCARDIOGRAM COMPLETE W/O CONT   Final Result      MR-BRAIN-W/O   Final Result      1.  Minimal supratentorial white matter disease most consistent with microvascular ischemic change. Common findings for the patient's age.   2.  No evidence of acute infarction, hemorrhage, or mass lesion.   3.  Susceptibility artifact due to orthodontic hardware.      CT-CTA NECK WITH & W/O-POST PROCESSING   Final Result      No high-grade stenosis, large vessel occlusion, aneurysm or dissection.      CT-CEREBRAL PERFUSION ANALYSIS   Final Result      1. Cerebral blood flow less than 30% possibly representing completed infarct = 0 mL. Based on distribution of this finding, this is unlikely to represent artifact.      2. T Max more than 6 seconds possibly representing combination of completed infarct and ischemia = 0 mL. Based on the distribution of this finding, this is unlikely to represent artifact.      3. Mismatched volume possibly representing ischemic brain/penumbra= 0 mL      4.  Please note that this cerebral perfusion study and report is Quantitative and targets supratentorial (cerebral) perfusion for evaluation of large vessel territory acute ischemia/infarction. For example, lacunar infarcts, and brainstem/posterior fossa    ischemia/infarction are not evaluated on this study.  Data acquisition is subject to artifacts which can yield  "non-anatomically plausible perfusion maps which may be due to motion, bolus timing, signal to noise ratio, or other technical factors.    Perfusion map abnormalities which show non-anatomic distributions are likely artifact.   This study is not \"stand-alone\" and should only be utilized for diagnosis, management/treatment in correlation with CT, CTA, and/or MRI and clinical factors.         CT-CTA HEAD WITH & W/O-POST PROCESS   Final Result      1.  No large vessel occlusion, high-grade stenosis or aneurysm of the [place] of Em.   2.  No CT evidence of acute infarct, hemorrhage or mass.      DX-CHEST-PORTABLE (1 VIEW)   Final Result         1. No acute cardiopulmonary abnormalities are identified.      CT-HEAD W/O   Final Result         1. No acute intracranial abnormality. No evidence of acute intracranial hemorrhage or mass lesion.                               Total time of the discharge process exceeds 37 minutes.         [1] No Known Allergies    "

## 2025-05-25 NOTE — ASSESSMENT & PLAN NOTE
Admit to Neuro, with continuous cardiac monitoring  CT, CT-angiography, head, neck showed no acute infarction, or hemorrhage or significant stenosis  I will check Echocardiography to rule out shunting  I will check MRI brain  Aspiration, Fall, and seizure precautions     Neuro checks

## 2025-05-25 NOTE — ED PROVIDER NOTES
ED Provider Note    CHIEF COMPLAINT  Chief Complaint   Patient presents with    Lightheadedness     Pt walked back to triage with unsteady gate, appears dizzy. She states that today she was sitting in the car at 1200 and had a sudden onset of lightheadedness that has gradually worsened since onset. No n/v/d, blurry vision, no unilateral weakness or facial droop. Pt A&Ox4 and answering questions appropriately.   Pt placed in wheelchair and brought back to room immediately. Code stroke called.        EXTERNAL RECORDS REVIEWED  Outpatient Notes patient was seen at [facility] 4/10/2025 for a subacute cough and Flonase and Nasacort was recommended along with oral antihistamines and a Medrol Dosepak was prescribed she has a past medical history of enlarged thyroid high cholesterol prediabetes and UTI    HPI/ROS  LIMITATION TO HISTORY   Select: : None  OUTSIDE HISTORIAN(S):  none    Susy Wheeler is a 64 y.o. female who presents stating that around noon today she started feeling lightheaded and unsteady.  She states it is gradually gotten worse to the point where she is having difficulty walking.  She denies any nausea or vomiting double vision or blurry vision she denies any unilateral weakness or facial droop.  Nothing makes the symptoms better or worse when she turns her head it is unchanged.  She denies any vision difficulty.  She has a history of high cholesterol she she is not on any medications.  She denies any smoking drinking or drug use.  She does have a family history of heart disease and stroke.  She denies any falls or head trauma.  She denies any neck stiffness fevers chills cough or cold symptoms she denies any chest pain palpitations or shortness of breath.  She is eating and drinking well.    PAST MEDICAL HISTORY   has a past medical history of Enlarged thyroid gland (4/5/2021), Hyperlipidemia, Prediabetes, and Urinary tract infection.    SURGICAL HISTORY   has a past surgical history that  "includes gyn surgery ().    FAMILY HISTORY  Family History   Problem Relation Age of Onset    COPD Mother     Hypertension Father     Stroke Father     Hypertension Sister     Hypertension Brother     Arthritis Brother     COPD Brother     Cancer Paternal Aunt         cervix cancer    Cancer Maternal Grandmother         chin and mouth    Heart Disease Maternal Grandfather     Hypertension Brother     Anxiety disorder Brother     Hypertension Sister     Cancer Sister         Pancreatic cancer    Cancer Paternal Aunt         breast       SOCIAL HISTORY  Social History     Tobacco Use    Smoking status: Former     Current packs/day: 0.00     Average packs/day: 0.3 packs/day for 4.0 years (1.0 ttl pk-yrs)     Types: Cigarettes     Start date: 1982     Quit date: 1986     Years since quittin.1    Smokeless tobacco: Never    Tobacco comments:     smoked 4 years in college, 3 per day social   Vaping Use    Vaping status: Never Used   Substance and Sexual Activity    Alcohol use: Yes     Comment: socially - wine    Drug use: Not Currently    Sexual activity: Not on file       CURRENT MEDICATIONS  Home Medications       Reviewed by Geovany EvansD (Pharmacist) on 25 at 1812  Med List Status: Complete     Medication Last Dose Status   albuterol 108 (90 Base) MCG/ACT Aero Soln inhalation aerosol Not Taking Active   therapeutic multivitamin-minerals (THERAGRAN-M) Tab Unknown Active   VITAMIN D PO Unknown Active                  Audit from Redirected Encounters    **Home medications have not yet been reviewed for this encounter**         ALLERGIES  Allergies[1]    PHYSICAL EXAM  VITAL SIGNS: BP (!) 138/94   Pulse 76   Temp 36.8 °C (98.2 °F) (Temporal)   Resp 18   Ht 1.702 m (5' 7\")   Wt 73.2 kg (161 lb 6 oz)   SpO2 94%   BMI 25.28 kg/m²      Constitutional: Well developed, Well nourished, No acute distress, Non-toxic appearance.   HEENT: Normocephalic, Atraumatic,  external ears normal, pharynx " pink,  Mucous  Membranes moist, No rhinorrhea or mucosal edema  Eyes: PERRL, EOMI, Conjunctiva normal, No discharge.   Neck: Normal range of motion, No tenderness, Supple, No stridor.   Lymphatic: No lymphadenopathy    Cardiovascular: Regular Rate and Rhythm, No murmurs,  rubs, or gallops.   Thorax & Lungs: Lungs clear to auscultation bilaterally, No respiratory distress, No wheezes, rhales or rhonchi, No chest wall tenderness.   Abdomen: Bowel sounds normal, Soft, non tender, non distended,  No pulsatile masses., no rebound guarding or peritoneal signs.   Skin: Warm, Dry, No erythema, No rash,   Back:  No CVA tenderness,  No spinal tenderness, bony crepitance step offs or instability.   Extremities: Equal, intact distal pulses, No cyanosis, clubbing or edema,  No tenderness.   Musculoskeletal: Good range of motion in all major joints. No tenderness to palpation or major deformities noted.   Neurologic: Alert & oriented No focal deficits noted.  Equal strength and sensation upper lower extremities bilaterally cranial nerves II through XII are intact normal speech no nystagmus  Psychiatric: Affect normal, Judgment normal, Mood normal.      EKG/LABS  Results for orders placed or performed during the hospital encounter of 05/24/25   POCT glucose device results    Collection Time: 05/24/25  5:14 PM   Result Value Ref Range    POC Glucose, Blood 93 65 - 99 mg/dL   CBC WITH DIFFERENTIAL    Collection Time: 05/24/25  5:15 PM   Result Value Ref Range    WBC 9.1 4.8 - 10.8 K/uL    RBC 4.89 4.20 - 5.40 M/uL    Hemoglobin 14.9 12.0 - 16.0 g/dL    Hematocrit 44.9 37.0 - 47.0 %    MCV 91.8 81.4 - 97.8 fL    MCH 30.5 27.0 - 33.0 pg    MCHC 33.2 32.2 - 35.5 g/dL    RDW 44.2 35.9 - 50.0 fL    Platelet Count 333 164 - 446 K/uL    MPV 11.2 9.0 - 12.9 fL    Neutrophils-Polys 62.80 44.00 - 72.00 %    Lymphocytes 27.20 22.00 - 41.00 %    Monocytes 7.40 0.00 - 13.40 %    Eosinophils 1.30 0.00 - 6.90 %    Basophils 1.10 0.00 - 1.80 %     Immature Granulocytes 0.20 0.00 - 0.90 %    Nucleated RBC 0.00 0.00 - 0.20 /100 WBC    Neutrophils (Absolute) 5.68 1.82 - 7.42 K/uL    Lymphs (Absolute) 2.46 1.00 - 4.80 K/uL    Monos (Absolute) 0.67 0.00 - 0.85 K/uL    Eos (Absolute) 0.12 0.00 - 0.51 K/uL    Baso (Absolute) 0.10 0.00 - 0.12 K/uL    Immature Granulocytes (abs) 0.02 0.00 - 0.11 K/uL    NRBC (Absolute) 0.00 K/uL   COMP METABOLIC PANEL    Collection Time: 25  5:15 PM   Result Value Ref Range    Sodium 140 135 - 145 mmol/L    Potassium 3.8 3.6 - 5.5 mmol/L    Chloride 103 96 - 112 mmol/L    Co2 21 20 - 33 mmol/L    Anion Gap 16.0 7.0 - 16.0    Glucose 85 65 - 99 mg/dL    Bun 17 8 - 22 mg/dL    Creatinine 1.01 0.50 - 1.40 mg/dL    Calcium 9.8 8.5 - 10.5 mg/dL    Correct Calcium 9.4 8.5 - 10.5 mg/dL    AST(SGOT) 30 12 - 45 U/L    ALT(SGPT) 36 2 - 50 U/L    Alkaline Phosphatase 93 30 - 99 U/L    Total Bilirubin 1.1 0.1 - 1.5 mg/dL    Albumin 4.5 3.2 - 4.9 g/dL    Total Protein 7.8 6.0 - 8.2 g/dL    Globulin 3.3 1.9 - 3.5 g/dL    A-G Ratio 1.4 g/dL   PROTHROMBIN TIME    Collection Time: 25  5:15 PM   Result Value Ref Range    PT 13.7 12.0 - 14.6 sec    INR 1.02 0.87 - 1.13   APTT    Collection Time: 25  5:15 PM   Result Value Ref Range    APTT 30.2 24.7 - 36.0 sec   TROPONIN    Collection Time: 25  5:15 PM   Result Value Ref Range    Troponin T <6 6 - 19 ng/L   ESTIMATED GFR    Collection Time: 25  5:15 PM   Result Value Ref Range    GFR (CKD-EPI) 62 >60 mL/min/1.73 m 2   EKG (NOW)    Collection Time: 25  6:09 PM   Result Value Ref Range    Report       [facility]    Test Date:  2025  Pt Name:    DANIKA SALVADOR                Department: [facility]  MRN:        [id number]                      [facility]  Gender:     Female                       Technician: carmen  :        1960                   Requested By:ER TRIAGE PROTOCOL  Order #:    [id number]                     Reading MD: HARI CATES MD    Measurements  Intervals                                Axis  Rate:       70                           P:          57  RI:         177                          QRS:        -43  QRSD:       105                          T:          22  QT:         449  QTc:        485    Interpretive Statements  Sinus rhythm  Left anterior fascicular block  Minimal ST elevation, anterior leads  Compared to ECG 06/16/2024 22:41:41  Left anterior fascicular block now present  ST (T wave) deviation now present  Left-axis deviation no longer present  Electronically Signed On 05- 18:09:10 PDT by HARI EISENBERG MD         I have independently interpreted this EKG    RADIOLOGY/PROCEDURES   I have independently interpreted the diagnostic imaging associated with this visit and am waiting the final reading from the radiologist.   My preliminary interpretation is as follows:  ct head no intracranial hemorrhage or mass effect    Radiologist interpretation:  CT-CTA NECK WITH & W/O-POST PROCESSING   Final Result      No high-grade stenosis, large vessel occlusion, aneurysm or dissection.      CT-CEREBRAL PERFUSION ANALYSIS   Final Result      1. Cerebral blood flow less than 30% possibly representing completed infarct = 0 mL. Based on distribution of this finding, this is unlikely to represent artifact.      2. T Max more than 6 seconds possibly representing combination of completed infarct and ischemia = 0 mL. Based on the distribution of this finding, this is unlikely to represent artifact.      3. Mismatched volume possibly representing ischemic brain/penumbra= 0 mL      4.  Please note that this cerebral perfusion study and report is Quantitative and targets supratentorial (cerebral) perfusion for evaluation of large vessel territory acute ischemia/infarction. For example, lacunar infarcts, and brainstem/posterior fossa    ischemia/infarction are not evaluated on this study.  Data acquisition is subject to  "artifacts which can yield non-anatomically plausible perfusion maps which may be due to motion, bolus timing, signal to noise ratio, or other technical factors.    Perfusion map abnormalities which show non-anatomic distributions are likely artifact.   This study is not \"stand-alone\" and should only be utilized for diagnosis, management/treatment in correlation with CT, CTA, and/or MRI and clinical factors.         CT-CTA HEAD WITH & W/O-POST PROCESS   Final Result      1.  No large vessel occlusion, high-grade stenosis or aneurysm of the [place] of Em.   2.  No CT evidence of acute infarct, hemorrhage or mass.      DX-CHEST-PORTABLE (1 VIEW)   Final Result         1. No acute cardiopulmonary abnormalities are identified.      CT-HEAD W/O   Final Result         1. No acute intracranial abnormality. No evidence of acute intracranial hemorrhage or mass lesion.                               COURSE & MEDICAL DECISION MAKING    ASSESSMENT, COURSE AND PLAN  Care Narrative:   Susy Wheeler is a 64 y.o. female who presents stating that around noon today she started feeling lightheaded and unsteady.  She states it is gradually gotten worse to the point where she is having difficulty walking.  She denies any nausea or vomiting double vision or blurry vision she denies any unilateral weakness or facial droop.  Nothing makes the symptoms better or worse when she turns her head it is unchanged.  She denies any vision difficulty.  She has a history of high cholesterol she she is not on any medications.  She denies any smoking drinking or drug use.  She does have a family history of heart disease and stroke.  She denies any falls or head trauma.  She denies any neck stiffness fevers chills cough or cold symptoms she denies any chest pain palpitations or shortness of breath.  She is eating and drinking well.  On physical exam she is alert awake in no acute distress speaking full senses heart is regular rhythm no murmurs gallops " lungs are clear to auscultation bilaterally abdomen is soft and nontender NIH is 0 with equal strength sensation of her lower extremities bilaterally normal nystagmus normal cranial nerves.  She has no extremity edema    CHEST PAIN:   HEART Score for Major Cardiac Events  HEART Score     History: Slightly suspicious  ECG: Non-specific repolarization disturbance  Age: 45-64  Risk Factors: 1-2 risk factors  Troponin: Less than or equal to normal limit    Heart Score: 3    Total Score   0-3 Points = Low Score, risk of MACE 0.9-1.7%.  4-6 Points = Moderate Score, risk of MACE 12-16.6%  7-10 Points = High Score, risk of MACE 50-65%    STROKE:   Time seen 5:15pm      National Institutes of Health (NIH) Stroke Scale   NIH Stroke Scale    Level of Consciousness: Alert, Keenly Responsive  Ask Month and Age: Both Questions Right  Blink Eyes and Squeeze Hands: Performs Both Tasks  Best Gaze: Normal  Visual: No Visual Loss  Facial Palsy: Normal Symmetrical Movements  Motor, Left Arm: No Drift  Motor, Right Arm: No Drift  Motor, Left Leg: No Drift  Motor, Right Leg: No Drift  Limb Ataxia: Absent  Sensory Loss: Normal  Best Language: No Aphasia  Dysarthria: Normal  Extinction and Inattention: No Abnormality    NIHSS Score: 0    No, this patient is not a candidate for thrombolytic therapy because patient symptoms have resolved and her NIH score is very low          ADDITIONAL PROBLEMS MANAGED      DISPOSITION AND DISCUSSIONS    CT head w/o shows no acute intercranial abnormality    CTA of the head shows no large vessel occlusion stenosis or aneurysm    CTA of the neck shows no mismatch    CT perfusion shows no high-grade stenosis large vessel occlusion or aneurysm or dissection    Chest x-ray shows no cardiac or pulmonary abnormalities    EKG shows normal sinus rhythm with nonspecific ST changes and left anterior fascicular block    Lab work shows glucose is 93.  White count is normal at 9.1 hemoglobin 14.9 platelet count 333 with  a normal differential.  Comprehensive metabolic panel is normal with normal electrolytes normal liver enzymes normal kidney function troponin is less than 6 coags are normal.  Upon recheck the patient's symptoms have now resolved she ambulates with a steady gait and does not feel off balance.    I explained to the patient that she could have TIA symptoms and to get the most complete workup we would need to admit her for an MRI of her brain.  The patient understands and is willing to stay.  I spoke with the hospitalist who accepts her for admission.    I have discussed management of the patient with the following physicians and IRINEO's:  hospitalist Dr Wood    Discussion of management with other [facility] or appropriate source(s): None     Escalation of care considered, and ultimately not performed:acute inpatient care management, however at this time, the patient is most appropriate for outpatient management    Barriers to care at this time, including but not limited to: none.     Decision tools and prescription drugs considered including, but not limited to: NIH is 0.    Patient will be admitted in guarded condition    FINAL DIAGNOSIS  1. Dizziness    2. TIA (transient ischemic attack)         Electronically signed by: Gay Nguyen M.D., 5/24/2025 5:11 PM           [1] No Known Allergies

## 2025-05-30 ENCOUNTER — OFFICE VISIT (OUTPATIENT)
Dept: URGENT CARE | Facility: CLINIC | Age: 65
End: 2025-05-30
Payer: COMMERCIAL

## 2025-05-30 VITALS
OXYGEN SATURATION: 98 % | BODY MASS INDEX: 25.74 KG/M2 | SYSTOLIC BLOOD PRESSURE: 112 MMHG | RESPIRATION RATE: 18 BRPM | HEIGHT: 67 IN | WEIGHT: 164 LBS | TEMPERATURE: 99.7 F | DIASTOLIC BLOOD PRESSURE: 60 MMHG | HEART RATE: 76 BPM

## 2025-05-30 DIAGNOSIS — R21 RASH AND NONSPECIFIC SKIN ERUPTION: ICD-10-CM

## 2025-05-30 DIAGNOSIS — T78.40XA ALLERGIC REACTION, INITIAL ENCOUNTER: ICD-10-CM

## 2025-05-30 RX ORDER — METHYLPREDNISOLONE 4 MG/1
TABLET ORAL
Qty: 21 TABLET | Refills: 0 | Status: SHIPPED | OUTPATIENT
Start: 2025-05-31

## 2025-05-30 RX ORDER — HYDROXYZINE HYDROCHLORIDE 25 MG/1
25 TABLET, FILM COATED ORAL 2 TIMES DAILY PRN
Qty: 20 TABLET | Refills: 0 | Status: SHIPPED | OUTPATIENT
Start: 2025-05-30

## 2025-05-30 RX ORDER — TRIAMCINOLONE ACETONIDE 0.25 MG/G
1 CREAM TOPICAL 2 TIMES DAILY PRN
Qty: 80 G | Refills: 0 | Status: SHIPPED | OUTPATIENT
Start: 2025-05-30

## 2025-05-30 RX ORDER — DIPHENHYDRAMINE HCL 25 MG
50 CAPSULE ORAL ONCE
Status: COMPLETED | OUTPATIENT
Start: 2025-05-30 | End: 2025-05-30

## 2025-05-30 RX ORDER — DEXAMETHASONE SODIUM PHOSPHATE 10 MG/ML
10 INJECTION, SOLUTION INTRA-ARTICULAR; INTRALESIONAL; INTRAMUSCULAR; INTRAVENOUS; SOFT TISSUE ONCE
Status: COMPLETED | OUTPATIENT
Start: 2025-05-30 | End: 2025-05-30

## 2025-05-30 RX ADMIN — DEXAMETHASONE SODIUM PHOSPHATE 10 MG: 10 INJECTION, SOLUTION INTRA-ARTICULAR; INTRALESIONAL; INTRAMUSCULAR; INTRAVENOUS; SOFT TISSUE at 14:38

## 2025-05-30 RX ADMIN — Medication 50 MG: at 14:36

## 2025-05-30 ASSESSMENT — ENCOUNTER SYMPTOMS
VOMITING: 0
COUGH: 0
WHEEZING: 0
ABDOMINAL PAIN: 0
NAUSEA: 0
DIARRHEA: 0
CONSTIPATION: 0
CHILLS: 0
FEVER: 0
SHORTNESS OF BREATH: 0
PALPITATIONS: 0

## 2025-05-30 ASSESSMENT — FIBROSIS 4 INDEX: FIB4 SCORE: 0.85

## 2025-05-30 NOTE — PROGRESS NOTES
"Subjective     Susy Wheeler is a 64 y.o. female who presents with Itching (X 1 day, itching around ears/neck spreading to chest, today itchiness all around body: back, legs, abdomen, chest, unsure if soap at a Hotel/shrimp consumption 2 days ago triggered ) and Other (Skin dryness on top of eye, underneath Rt eyebrow x 2 weeks )            Susy is a 65 yo. Female who presents to urgent care with rash/hives which started yesterday. Rash is itchy in characteristic.  Reports no known exposures and no known allergies.  She did have shrimp a few days ago but states that she has had strep multiple times in the past with a rash.  She did also recently stay at hotel so she does not know if the hotel caused the rash.        Review of Systems   Constitutional:  Negative for chills and fever.   Respiratory:  Negative for cough, shortness of breath and wheezing.    Cardiovascular:  Negative for chest pain and palpitations.   Gastrointestinal:  Negative for abdominal pain, constipation, diarrhea, nausea and vomiting.   Skin:  Positive for itching and rash.   All other systems reviewed and are negative.             Objective     /60 (BP Location: Left arm, Patient Position: Sitting, BP Cuff Size: Adult)   Pulse 76   Temp 37.6 °C (99.7 °F) (Temporal)   Resp 18   Ht 1.702 m (5' 7\")   Wt 74.4 kg (164 lb)   SpO2 98%   BMI 25.69 kg/m²      Physical Exam  Vitals reviewed.   Constitutional:       Appearance: Normal appearance.   HENT:      Head: Normocephalic and atraumatic.      Nose: Nose normal.      Mouth/Throat:      Lips: Pink.      Mouth: Mucous membranes are moist. No angioedema.      Pharynx: Oropharynx is clear. Uvula midline. No pharyngeal swelling or uvula swelling.   Eyes:      Extraocular Movements: Extraocular movements intact.      Conjunctiva/sclera: Conjunctivae normal.      Pupils: Pupils are equal, round, and reactive to light.   Cardiovascular:      Rate and Rhythm: Normal rate.   Pulmonary:      " Effort: Pulmonary effort is normal. No respiratory distress.      Breath sounds: Normal breath sounds. No stridor. No wheezing, rhonchi or rales.   Skin:     General: Skin is warm and dry.      Findings: Rash (chest, trunk, bilateral upper legs) present. Rash is urticarial.      Comments: Excoriations of bilateral upper thighs.   Neurological:      Mental Status: She is alert.                                  Assessment & Plan  Allergic reaction, initial encounter    Orders:    diphenhydrAMINE (Benadryl) capsule 50 mg    dexamethasone (Decadron) injection (check route below) 10 mg    methylPREDNISolone (MEDROL DOSEPAK) 4 MG Tablet Therapy Pack; Follow schedule on package instructions.    Rash and nonspecific skin eruption    Orders:    methylPREDNISolone (MEDROL DOSEPAK) 4 MG Tablet Therapy Pack; Follow schedule on package instructions.    triamcinolone acetonide (KENALOG) 0.025 % Cream; Apply 1 Application topically 2 times a day as needed (rash).    hydrOXYzine HCl (ATARAX) 25 MG Tab; Take 1 Tablet by mouth 2 times a day as needed for Itching.             Differential diagnoses, supportive care measures and indications for immediate follow-up discussed with patient. Pathogenesis of diagnosis discussed including typical length and natural progression.  Follow up with PCP. ER precautions discussed.    Instructed to return to urgent care or nearest emergency department if symptoms fail to improve, for any change in condition, further concerns, or new concerning symptoms.    Patient states understanding and agrees with the plan of care and discharge instructions.

## 2025-06-09 ENCOUNTER — OFFICE VISIT (OUTPATIENT)
Dept: MEDICAL GROUP | Facility: LAB | Age: 65
End: 2025-06-09
Payer: COMMERCIAL

## 2025-06-09 VITALS
HEART RATE: 70 BPM | RESPIRATION RATE: 16 BRPM | HEIGHT: 67 IN | WEIGHT: 161 LBS | BODY MASS INDEX: 25.27 KG/M2 | TEMPERATURE: 97.8 F | OXYGEN SATURATION: 95 % | DIASTOLIC BLOOD PRESSURE: 72 MMHG | SYSTOLIC BLOOD PRESSURE: 108 MMHG

## 2025-06-09 DIAGNOSIS — Z09 HOSPITAL DISCHARGE FOLLOW-UP: Primary | ICD-10-CM

## 2025-06-09 DIAGNOSIS — R42 LIGHTHEADEDNESS: ICD-10-CM

## 2025-06-09 ASSESSMENT — FIBROSIS 4 INDEX: FIB4 SCORE: 0.85

## 2025-06-09 ASSESSMENT — ENCOUNTER SYMPTOMS
CHILLS: 0
SHORTNESS OF BREATH: 0
PALPITATIONS: 0
FEVER: 0

## 2025-06-09 NOTE — PROGRESS NOTES
"Subjective:     Susy Wheeler is a 64 y.o. female who presents for Hospital Follow-up.    Transitional Care Management          HPI:   Recently hospitalized on 5/24/25 for 18 hours for stroke concerns.    Discharge summary below \"This is a \"Susy Wheeler is a 64 y.o. female who presented 5/24/2025 with dizziness, lightheadedness and difficulty maintaining balance with walking.  Symptoms started suddenly on the day of admission.  She denies having blurry vision, nausea, vomiting or focal motor weakness.  She did not have loss of consciousness\" (as per admitting physician Dr. Wood in H&P).     After admission patient did improve on her condition.  She no longer had any dizziness.  We did a brain MRI which was negative for any acute stroke findings.  She does have chronic microvascular disease which could be indicating she has uncontrolled hypertension at times.     She does not use any antihypertensive medications at home.  Her BP was up to 142/68.  She states she has been trying to lose weight but has in the last few years gained weight.  Her BP on discharge was 111/69.  I did recommend for patient to take blood pressure 3 times a day, form a blood pressure diary and return to her primary care provider.  It is possible she has undiagnosed hypertension.  Patient preferred to change her lifestyle before starting any oral medications at this time\"    Interval hx:   -For the most part patient reports feeling overall pretty well, denies syncopal episode since discharge   -Of note patient was seen in the urgent care on 5/30 for a allergic rash which she was given a dexamethasone injection    Current medicines (including reconciliation performed today)  Current Medications[1]    Allergies:   Patient has no known allergies.    Social History[2]    ROS:  Review of Systems   Constitutional:  Negative for chills and fever.   Respiratory:  Negative for shortness of breath.    Cardiovascular:  Negative for chest pain and " "palpitations.         Objective:     Vitals:    06/09/25 1302   BP: 108/72   BP Location: Right arm   Patient Position: Sitting   BP Cuff Size: Adult   Pulse: 70   Resp: 16   Temp: 36.6 °C (97.8 °F)   TempSrc: Temporal   SpO2: 95%   Weight: 73 kg (161 lb)   Height: 1.702 m (5' 7\")     Body mass index is 25.22 kg/m².    Physical Exam:  Physical Exam  Constitutional:       General: She is not in acute distress.     Appearance: She is not ill-appearing.   Cardiovascular:      Rate and Rhythm: Normal rate and regular rhythm.      Pulses: Normal pulses.      Heart sounds:      No friction rub. No gallop.   Pulmonary:      Effort: Pulmonary effort is normal. No respiratory distress.      Breath sounds: No wheezing, rhonchi or rales.   Neurological:      Mental Status: She is alert.   Psychiatric:         Mood and Affect: Mood normal.         Behavior: Behavior normal.         Thought Content: Thought content normal.         Judgment: Judgment normal.           Assessment and Plan:   1. Lightheadedness      - Chart and discharge summary were reviewed.   - Hospitalization and results reviewed with patient.   - Medications reviewed including instructions regarding high risk medications, dosing and side effects.  - Recommended Services: No services needed at this time  - Advance directive/POLST on file?  No     Follow-up:No follow-ups on file.    Face-to-face transitional care management services with [facility] (today's visit is within 14 days post discharge & LACE+ score of 28-58) medical decision complexity were provided.                [1]   Current Outpatient Medications   Medication Sig Dispense Refill    VITAMIN D PO Take 1 Tablet by mouth every day.      methylPREDNISolone (MEDROL DOSEPAK) 4 MG Tablet Therapy Pack Follow schedule on package instructions. (Patient not taking: Reported on 6/9/2025) 21 Tablet 0    triamcinolone acetonide (KENALOG) 0.025 % Cream Apply 1 Application topically 2 times a day as needed (rash). " (Patient not taking: Reported on 2025) 80 g 0    hydrOXYzine HCl (ATARAX) 25 MG Tab Take 1 Tablet by mouth 2 times a day as needed for Itching. (Patient not taking: Reported on 2025) 20 Tablet 0    therapeutic multivitamin-minerals (THERAGRAN-M) Tab Take 1 Tablet by mouth every day. (Patient not taking: Reported on 2025)      albuterol 108 (90 Base) MCG/ACT Aero Soln inhalation aerosol Inhale 2 Puffs every 6 hours as needed for Shortness of Breath. (Patient not taking: Reported on 2025) 8.5 g 1     No current facility-administered medications for this visit.   [2]   Social History  Tobacco Use    Smoking status: Former     Current packs/day: 0.00     Average packs/day: 0.3 packs/day for 4.0 years (1.0 ttl pk-yrs)     Types: Cigarettes     Start date: 1982     Quit date: 1986     Years since quittin.2    Smokeless tobacco: Never    Tobacco comments:     smoked 4 years in college, 3 per day social   Vaping Use    Vaping status: Never Used   Substance Use Topics    Alcohol use: Yes     Comment: socially - wine    Drug use: Not Currently

## 2025-08-05 ENCOUNTER — HOSPITAL ENCOUNTER (OUTPATIENT)
Dept: CARDIOLOGY | Facility: MEDICAL CENTER | Age: 65
End: 2025-08-05
Attending: STUDENT IN AN ORGANIZED HEALTH CARE EDUCATION/TRAINING PROGRAM
Payer: COMMERCIAL

## 2025-08-05 DIAGNOSIS — R01.1 SYSTOLIC MURMUR: ICD-10-CM

## 2025-08-05 LAB
LV EJECT FRACT MOD 2C 99903: 70.96
LV EJECT FRACT MOD 4C 99902: 70.37
LV EJECT FRACT MOD BP 99901: 70.82

## 2025-08-05 PROCEDURE — 93306 TTE W/DOPPLER COMPLETE: CPT | Mod: 26 | Performed by: INTERNAL MEDICINE

## 2025-08-05 PROCEDURE — 93306 TTE W/DOPPLER COMPLETE: CPT

## 2025-09-04 ENCOUNTER — APPOINTMENT (OUTPATIENT)
Dept: MEDICAL GROUP | Facility: LAB | Age: 65
End: 2025-09-04
Payer: COMMERCIAL

## 2025-11-14 ENCOUNTER — APPOINTMENT (OUTPATIENT)
Dept: MEDICAL GROUP | Facility: LAB | Age: 65
End: 2025-11-14
Payer: COMMERCIAL